# Patient Record
Sex: FEMALE | Race: WHITE | NOT HISPANIC OR LATINO
[De-identification: names, ages, dates, MRNs, and addresses within clinical notes are randomized per-mention and may not be internally consistent; named-entity substitution may affect disease eponyms.]

---

## 2018-06-28 PROBLEM — Z00.00 ENCOUNTER FOR PREVENTIVE HEALTH EXAMINATION: Status: ACTIVE | Noted: 2018-06-28

## 2018-08-08 ENCOUNTER — APPOINTMENT (OUTPATIENT)
Dept: ANTEPARTUM | Facility: CLINIC | Age: 23
End: 2018-08-08
Payer: COMMERCIAL

## 2018-08-08 ENCOUNTER — ASOB RESULT (OUTPATIENT)
Age: 23
End: 2018-08-08

## 2018-08-08 PROCEDURE — 76811 OB US DETAILED SNGL FETUS: CPT

## 2018-08-08 PROCEDURE — 76817 TRANSVAGINAL US OBSTETRIC: CPT

## 2018-11-27 ENCOUNTER — ASOB RESULT (OUTPATIENT)
Age: 23
End: 2018-11-27

## 2018-11-27 ENCOUNTER — APPOINTMENT (OUTPATIENT)
Dept: ANTEPARTUM | Facility: CLINIC | Age: 23
End: 2018-11-27
Payer: COMMERCIAL

## 2018-11-27 ENCOUNTER — APPOINTMENT (OUTPATIENT)
Dept: MATERNAL FETAL MEDICINE | Facility: CLINIC | Age: 23
End: 2018-11-27
Payer: COMMERCIAL

## 2018-11-27 VITALS
HEIGHT: 65 IN | OXYGEN SATURATION: 100 % | DIASTOLIC BLOOD PRESSURE: 76 MMHG | BODY MASS INDEX: 25.16 KG/M2 | WEIGHT: 151 LBS | SYSTOLIC BLOOD PRESSURE: 106 MMHG | HEART RATE: 96 BPM

## 2018-11-27 PROCEDURE — 99244 OFF/OP CNSLTJ NEW/EST MOD 40: CPT

## 2018-11-27 PROCEDURE — 76816 OB US FOLLOW-UP PER FETUS: CPT

## 2018-11-29 ENCOUNTER — APPOINTMENT (OUTPATIENT)
Dept: CARDIOLOGY | Facility: CLINIC | Age: 23
End: 2018-11-29
Payer: COMMERCIAL

## 2018-11-29 ENCOUNTER — NON-APPOINTMENT (OUTPATIENT)
Age: 23
End: 2018-11-29

## 2018-11-29 VITALS
HEART RATE: 84 BPM | SYSTOLIC BLOOD PRESSURE: 105 MMHG | BODY MASS INDEX: 25.16 KG/M2 | WEIGHT: 151 LBS | DIASTOLIC BLOOD PRESSURE: 70 MMHG | HEIGHT: 65 IN | OXYGEN SATURATION: 98 %

## 2018-11-29 DIAGNOSIS — O09.90 SUPERVISION OF HIGH RISK PREGNANCY, UNSPECIFIED, UNSPECIFIED TRIMESTER: ICD-10-CM

## 2018-11-29 DIAGNOSIS — I77.89 OTHER SPECIFIED DISORDERS OF ARTERIES AND ARTERIOLES: ICD-10-CM

## 2018-11-29 PROCEDURE — 99244 OFF/OP CNSLTJ NEW/EST MOD 40: CPT

## 2018-11-29 PROCEDURE — 93000 ELECTROCARDIOGRAM COMPLETE: CPT

## 2018-11-29 RX ORDER — METOPROLOL SUCCINATE 25 MG/1
25 TABLET, EXTENDED RELEASE ORAL
Qty: 45 | Refills: 3 | Status: ACTIVE | COMMUNITY
Start: 2018-11-29 | End: 1900-01-01

## 2018-11-29 RX ORDER — LEVOTHYROXINE SODIUM 100 UG/1
100 TABLET ORAL
Refills: 0 | Status: ACTIVE | COMMUNITY

## 2018-11-29 NOTE — REVIEW OF SYSTEMS
Pt reports she has been having diarrhea since Friday. Pt states lower abdominal pain and upper abdominal pain as well since Friday. Pt denies any nausea or vomiting. Pt denies eating anything out of the ordinary or going out to eat. Pt states now the pain is a little resolved, but she reports generalized abdominal pain. Pt was in the walk in clinic and sent to the ED for evaluation of rebound tenderness. Pt states the diarrhea has since resolved. Pt denies any fevers.    [Shortness Of Breath] : no shortness of breath [Dyspnea on exertion] : not dyspnea during exertion [Chest Pain] : no chest pain [Lower Ext Edema] : no extremity edema [Palpitations] : no palpitations [Negative] : Heme/Lymph

## 2018-11-29 NOTE — PHYSICAL EXAM
[General Appearance - Well Developed] : well developed [Normal Appearance] : normal appearance [Well Groomed] : well groomed [General Appearance - Well Nourished] : well nourished [No Deformities] : no deformities [General Appearance - In No Acute Distress] : no acute distress [Normal Conjunctiva] : the conjunctiva exhibited no abnormalities [Eyelids - No Xanthelasma] : the eyelids demonstrated no xanthelasmas [Normal Oral Mucosa] : normal oral mucosa [No Oral Pallor] : no oral pallor [No Oral Cyanosis] : no oral cyanosis [Normal Jugular Venous A Waves Present] : normal jugular venous A waves present [Normal Jugular Venous V Waves Present] : normal jugular venous V waves present [No Jugular Venous Holt A Waves] : no jugular venous holt A waves [Heart Rate And Rhythm] : heart rate and rhythm were normal [Heart Sounds] : normal S1 and S2 [Murmurs] : no murmurs present [Respiration, Rhythm And Depth] : normal respiratory rhythm and effort [Exaggerated Use Of Accessory Muscles For Inspiration] : no accessory muscle use [Auscultation Breath Sounds / Voice Sounds] : lungs were clear to auscultation bilaterally [Abdomen Soft] : soft [Abdomen Tenderness] : non-tender [Abdomen Mass (___ Cm)] : no abdominal mass palpated [Abnormal Walk] : normal gait [Gait - Sufficient For Exercise Testing] : the gait was sufficient for exercise testing [Nail Clubbing] : no clubbing of the fingernails [Cyanosis, Localized] : no localized cyanosis [Petechial Hemorrhages (___cm)] : no petechial hemorrhages [Skin Color & Pigmentation] : normal skin color and pigmentation [] : no rash [No Venous Stasis] : no venous stasis [Skin Lesions] : no skin lesions [No Skin Ulcers] : no skin ulcer [No Xanthoma] : no  xanthoma was observed [Oriented To Time, Place, And Person] : oriented to person, place, and time [Affect] : the affect was normal [Mood] : the mood was normal [No Anxiety] : not feeling anxious

## 2018-11-29 NOTE — DISCUSSION/SUMMARY
[FreeTextEntry1] : The patient is a 23-year-old female +FH aortic aneurysm, 36 weeks gestation who has been followed for aortic size. ECHO has been stable for years with normal aortic size. This was her first MRI with measurement of 3.8cm. I would recommend metoprolol 12.5mg daily and proceed with vaginal delivery facilitated second stage in view of family history. There are no cardiac indications for  section.

## 2018-11-29 NOTE — HISTORY OF PRESENT ILLNESS
[FreeTextEntry1] : Cindy is a 23-year-old female 36 weeks gestation with family history of dilated aorta who presents for evaluation. She has been followed at [facility] and will be delivering here.

## 2018-12-27 ENCOUNTER — ASOB RESULT (OUTPATIENT)
Age: 23
End: 2018-12-27

## 2018-12-27 ENCOUNTER — APPOINTMENT (OUTPATIENT)
Dept: ANTEPARTUM | Facility: CLINIC | Age: 23
End: 2018-12-27
Payer: COMMERCIAL

## 2018-12-27 PROCEDURE — 76816 OB US FOLLOW-UP PER FETUS: CPT | Mod: 26

## 2018-12-27 PROCEDURE — 76818 FETAL BIOPHYS PROFILE W/NST: CPT | Mod: 26

## 2018-12-29 ENCOUNTER — TRANSCRIPTION ENCOUNTER (OUTPATIENT)
Age: 23
End: 2018-12-29

## 2018-12-29 ENCOUNTER — INPATIENT (INPATIENT)
Facility: HOSPITAL | Age: 23
LOS: 3 days | Discharge: HOME | End: 2019-01-02
Attending: OBSTETRICS & GYNECOLOGY | Admitting: OBSTETRICS & GYNECOLOGY
Payer: COMMERCIAL

## 2018-12-29 DIAGNOSIS — Z3A.00 WEEKS OF GESTATION OF PREGNANCY NOT SPECIFIED: ICD-10-CM

## 2018-12-29 DIAGNOSIS — O26.899 OTHER SPECIFIED PREGNANCY RELATED CONDITIONS, UNSPECIFIED TRIMESTER: ICD-10-CM

## 2018-12-29 DIAGNOSIS — Z34.80 ENCOUNTER FOR SUPERVISION OF OTHER NORMAL PREGNANCY, UNSPECIFIED TRIMESTER: ICD-10-CM

## 2018-12-29 RX ORDER — SODIUM CHLORIDE 9 MG/ML
1000 INJECTION, SOLUTION INTRAVENOUS
Qty: 0 | Refills: 0 | Status: DISCONTINUED | OUTPATIENT
Start: 2018-12-29 | End: 2018-12-30

## 2018-12-29 RX ORDER — CITRIC ACID/SODIUM CITRATE 300-500 MG
15 SOLUTION, ORAL ORAL EVERY 4 HOURS
Qty: 0 | Refills: 0 | Status: DISCONTINUED | OUTPATIENT
Start: 2018-12-29 | End: 2018-12-30

## 2018-12-29 RX ORDER — OXYTOCIN 10 UNIT/ML
333.33 VIAL (ML) INJECTION
Qty: 20 | Refills: 0 | Status: DISCONTINUED | OUTPATIENT
Start: 2018-12-29 | End: 2018-12-30

## 2018-12-29 RX ORDER — SODIUM CHLORIDE 9 MG/ML
1000 INJECTION, SOLUTION INTRAVENOUS ONCE
Qty: 0 | Refills: 0 | Status: COMPLETED | OUTPATIENT
Start: 2018-12-29 | End: 2018-12-29

## 2018-12-29 RX ADMIN — SODIUM CHLORIDE 2000 MILLILITER(S): 9 INJECTION, SOLUTION INTRAVENOUS at 23:50

## 2018-12-30 VITALS — HEIGHT: 65 IN | WEIGHT: 147.71 LBS

## 2018-12-30 LAB
BASOPHILS # BLD AUTO: 0 K/UL — SIGNIFICANT CHANGE UP (ref 0–0.2)
BASOPHILS NFR BLD AUTO: 0.6 % — SIGNIFICANT CHANGE UP (ref 0–2)
BLD GP AB SCN SERPL QL: NEGATIVE — SIGNIFICANT CHANGE UP
EOSINOPHIL # BLD AUTO: 0.1 K/UL — SIGNIFICANT CHANGE UP (ref 0–0.5)
EOSINOPHIL NFR BLD AUTO: 0.9 % — SIGNIFICANT CHANGE UP (ref 0–6)
HCT VFR BLD CALC: 36.2 % — SIGNIFICANT CHANGE UP (ref 34.5–45)
HGB BLD-MCNC: 13.1 G/DL — SIGNIFICANT CHANGE UP (ref 11.5–15.5)
LYMPHOCYTES # BLD AUTO: 1.5 K/UL — SIGNIFICANT CHANGE UP (ref 1–3.3)
LYMPHOCYTES # BLD AUTO: 18.7 % — SIGNIFICANT CHANGE UP (ref 13–44)
MCHC RBC-ENTMCNC: 31.7 PG — SIGNIFICANT CHANGE UP (ref 27–34)
MCHC RBC-ENTMCNC: 36.3 GM/DL — HIGH (ref 32–36)
MCV RBC AUTO: 87.2 FL — SIGNIFICANT CHANGE UP (ref 80–100)
MONOCYTES # BLD AUTO: 0.6 K/UL — SIGNIFICANT CHANGE UP (ref 0–0.9)
MONOCYTES NFR BLD AUTO: 7.3 % — SIGNIFICANT CHANGE UP (ref 2–14)
NEUTROPHILS # BLD AUTO: 5.8 K/UL — SIGNIFICANT CHANGE UP (ref 1.8–7.4)
NEUTROPHILS NFR BLD AUTO: 72.5 % — SIGNIFICANT CHANGE UP (ref 43–77)
PLATELET # BLD AUTO: 138 K/UL — LOW (ref 150–400)
RBC # BLD: 4.15 M/UL — SIGNIFICANT CHANGE UP (ref 3.8–5.2)
RBC # FLD: 13.3 % — SIGNIFICANT CHANGE UP (ref 10.3–14.5)
RH IG SCN BLD-IMP: POSITIVE — SIGNIFICANT CHANGE UP
RH IG SCN BLD-IMP: POSITIVE — SIGNIFICANT CHANGE UP
T PALLIDUM AB TITR SER: NEGATIVE — SIGNIFICANT CHANGE UP
WBC # BLD: 7.9 K/UL — SIGNIFICANT CHANGE UP (ref 3.8–10.5)
WBC # FLD AUTO: 7.9 K/UL — SIGNIFICANT CHANGE UP (ref 3.8–10.5)

## 2018-12-30 RX ORDER — NALOXONE HYDROCHLORIDE 4 MG/.1ML
0.1 SPRAY NASAL
Qty: 0 | Refills: 0 | Status: DISCONTINUED | OUTPATIENT
Start: 2018-12-30 | End: 2019-01-01

## 2018-12-30 RX ORDER — SODIUM CHLORIDE 9 MG/ML
1000 INJECTION, SOLUTION INTRAVENOUS
Qty: 0 | Refills: 0 | Status: DISCONTINUED | OUTPATIENT
Start: 2018-12-30 | End: 2018-12-30

## 2018-12-30 RX ORDER — SODIUM CHLORIDE 9 MG/ML
1000 INJECTION, SOLUTION INTRAVENOUS
Qty: 0 | Refills: 0 | Status: DISCONTINUED | OUTPATIENT
Start: 2018-12-30 | End: 2019-01-02

## 2018-12-30 RX ORDER — CEFAZOLIN SODIUM 1 G
VIAL (EA) INJECTION
Qty: 0 | Refills: 0 | Status: DISCONTINUED | OUTPATIENT
Start: 2018-12-30 | End: 2018-12-30

## 2018-12-30 RX ORDER — OXYTOCIN 10 UNIT/ML
41.67 VIAL (ML) INJECTION
Qty: 20 | Refills: 0 | Status: DISCONTINUED | OUTPATIENT
Start: 2018-12-30 | End: 2019-01-02

## 2018-12-30 RX ORDER — SIMETHICONE 80 MG/1
80 TABLET, CHEWABLE ORAL EVERY 4 HOURS
Qty: 0 | Refills: 0 | Status: DISCONTINUED | OUTPATIENT
Start: 2018-12-30 | End: 2019-01-02

## 2018-12-30 RX ORDER — LEVOTHYROXINE SODIUM 125 MCG
100 TABLET ORAL DAILY
Qty: 0 | Refills: 0 | Status: DISCONTINUED | OUTPATIENT
Start: 2018-12-30 | End: 2018-12-31

## 2018-12-30 RX ORDER — DIPHENHYDRAMINE HCL 50 MG
25 CAPSULE ORAL EVERY 6 HOURS
Qty: 0 | Refills: 0 | Status: DISCONTINUED | OUTPATIENT
Start: 2018-12-30 | End: 2019-01-02

## 2018-12-30 RX ORDER — OXYTOCIN 10 UNIT/ML
333.33 VIAL (ML) INJECTION
Qty: 20 | Refills: 0 | Status: DISCONTINUED | OUTPATIENT
Start: 2018-12-30 | End: 2019-01-02

## 2018-12-30 RX ORDER — FERROUS SULFATE 325(65) MG
325 TABLET ORAL DAILY
Qty: 0 | Refills: 0 | Status: DISCONTINUED | OUTPATIENT
Start: 2018-12-30 | End: 2018-12-31

## 2018-12-30 RX ORDER — TETANUS TOXOID, REDUCED DIPHTHERIA TOXOID AND ACELLULAR PERTUSSIS VACCINE, ADSORBED 5; 2.5; 8; 8; 2.5 [IU]/.5ML; [IU]/.5ML; UG/.5ML; UG/.5ML; UG/.5ML
0.5 SUSPENSION INTRAMUSCULAR ONCE
Qty: 0 | Refills: 0 | Status: DISCONTINUED | OUTPATIENT
Start: 2018-12-30 | End: 2019-01-02

## 2018-12-30 RX ORDER — CEFAZOLIN SODIUM 1 G
2000 VIAL (EA) INJECTION EVERY 8 HOURS
Qty: 0 | Refills: 0 | Status: COMPLETED | OUTPATIENT
Start: 2018-12-30 | End: 2018-12-31

## 2018-12-30 RX ORDER — SODIUM CHLORIDE 9 MG/ML
1000 INJECTION INTRAMUSCULAR; INTRAVENOUS; SUBCUTANEOUS
Qty: 0 | Refills: 0 | Status: DISCONTINUED | OUTPATIENT
Start: 2018-12-30 | End: 2019-01-02

## 2018-12-30 RX ORDER — OXYTOCIN 10 UNIT/ML
4 VIAL (ML) INJECTION
Qty: 30 | Refills: 0 | Status: DISCONTINUED | OUTPATIENT
Start: 2018-12-30 | End: 2018-12-30

## 2018-12-30 RX ORDER — CEFAZOLIN SODIUM 1 G
2000 VIAL (EA) INJECTION ONCE
Qty: 0 | Refills: 0 | Status: DISCONTINUED | OUTPATIENT
Start: 2018-12-30 | End: 2018-12-30

## 2018-12-30 RX ORDER — KETOROLAC TROMETHAMINE 30 MG/ML
30 SYRINGE (ML) INJECTION EVERY 6 HOURS
Qty: 0 | Refills: 0 | Status: COMPLETED | OUTPATIENT
Start: 2018-12-30 | End: 2019-01-01

## 2018-12-30 RX ORDER — SODIUM CHLORIDE 9 MG/ML
300 INJECTION INTRAMUSCULAR; INTRAVENOUS; SUBCUTANEOUS ONCE
Qty: 0 | Refills: 0 | Status: DISCONTINUED | OUTPATIENT
Start: 2018-12-30 | End: 2019-01-02

## 2018-12-30 RX ORDER — LANOLIN
1 OINTMENT (GRAM) TOPICAL
Qty: 0 | Refills: 0 | Status: DISCONTINUED | OUTPATIENT
Start: 2018-12-30 | End: 2019-01-02

## 2018-12-30 RX ORDER — HEPARIN SODIUM 5000 [USP'U]/ML
5000 INJECTION INTRAVENOUS; SUBCUTANEOUS EVERY 12 HOURS
Qty: 0 | Refills: 0 | Status: DISCONTINUED | OUTPATIENT
Start: 2018-12-30 | End: 2019-01-02

## 2018-12-30 RX ORDER — DEXAMETHASONE 0.5 MG/5ML
4 ELIXIR ORAL EVERY 6 HOURS
Qty: 0 | Refills: 0 | Status: DISCONTINUED | OUTPATIENT
Start: 2018-12-30 | End: 2019-01-01

## 2018-12-30 RX ORDER — GLYCERIN ADULT
1 SUPPOSITORY, RECTAL RECTAL AT BEDTIME
Qty: 0 | Refills: 0 | Status: DISCONTINUED | OUTPATIENT
Start: 2018-12-30 | End: 2019-01-02

## 2018-12-30 RX ORDER — OXYTOCIN 10 UNIT/ML
1 VIAL (ML) INJECTION
Qty: 30 | Refills: 0 | Status: DISCONTINUED | OUTPATIENT
Start: 2018-12-30 | End: 2019-01-02

## 2018-12-30 RX ORDER — CEFAZOLIN SODIUM 1 G
2000 VIAL (EA) INJECTION EVERY 8 HOURS
Qty: 0 | Refills: 0 | Status: DISCONTINUED | OUTPATIENT
Start: 2018-12-30 | End: 2018-12-30

## 2018-12-30 RX ORDER — ONDANSETRON 8 MG/1
4 TABLET, FILM COATED ORAL EVERY 6 HOURS
Qty: 0 | Refills: 0 | Status: DISCONTINUED | OUTPATIENT
Start: 2018-12-30 | End: 2019-01-01

## 2018-12-30 RX ORDER — DOCUSATE SODIUM 100 MG
100 CAPSULE ORAL
Qty: 0 | Refills: 0 | Status: DISCONTINUED | OUTPATIENT
Start: 2018-12-30 | End: 2018-12-31

## 2018-12-30 RX ADMIN — Medication 30 MILLIGRAM(S): at 17:22

## 2018-12-30 RX ADMIN — Medication 4 MILLIUNIT(S)/MIN: at 09:38

## 2018-12-30 RX ADMIN — Medication 30 MILLIGRAM(S): at 23:40

## 2018-12-30 RX ADMIN — Medication 0.25 MILLIGRAM(S): at 11:27

## 2018-12-30 RX ADMIN — SODIUM CHLORIDE 125 MILLILITER(S): 9 INJECTION, SOLUTION INTRAVENOUS at 00:25

## 2018-12-30 RX ADMIN — Medication 100 MICROGRAM(S): at 11:23

## 2018-12-30 RX ADMIN — Medication 100 MILLIGRAM(S): at 23:41

## 2018-12-31 LAB
BASOPHILS # BLD AUTO: 0 K/UL — SIGNIFICANT CHANGE UP (ref 0–0.2)
BASOPHILS NFR BLD AUTO: 0.1 % — SIGNIFICANT CHANGE UP (ref 0–2)
EOSINOPHIL # BLD AUTO: 0.1 K/UL — SIGNIFICANT CHANGE UP (ref 0–0.5)
EOSINOPHIL NFR BLD AUTO: 1.6 % — SIGNIFICANT CHANGE UP (ref 0–6)
HCT VFR BLD CALC: 27.3 % — LOW (ref 34.5–45)
HGB BLD-MCNC: 9.5 G/DL — LOW (ref 11.5–15.5)
LYMPHOCYTES # BLD AUTO: 1.1 K/UL — SIGNIFICANT CHANGE UP (ref 1–3.3)
LYMPHOCYTES # BLD AUTO: 15.3 % — SIGNIFICANT CHANGE UP (ref 13–44)
MCHC RBC-ENTMCNC: 31.3 PG — SIGNIFICANT CHANGE UP (ref 27–34)
MCHC RBC-ENTMCNC: 34.9 GM/DL — SIGNIFICANT CHANGE UP (ref 32–36)
MCV RBC AUTO: 89.5 FL — SIGNIFICANT CHANGE UP (ref 80–100)
MONOCYTES # BLD AUTO: 0.5 K/UL — SIGNIFICANT CHANGE UP (ref 0–0.9)
MONOCYTES NFR BLD AUTO: 7.6 % — SIGNIFICANT CHANGE UP (ref 2–14)
NEUTROPHILS # BLD AUTO: 5.4 K/UL — SIGNIFICANT CHANGE UP (ref 1.8–7.4)
NEUTROPHILS NFR BLD AUTO: 75.3 % — SIGNIFICANT CHANGE UP (ref 43–77)
PLATELET # BLD AUTO: 105 K/UL — LOW (ref 150–400)
RBC # BLD: 3.05 M/UL — LOW (ref 3.8–5.2)
RBC # FLD: 13.6 % — SIGNIFICANT CHANGE UP (ref 10.3–14.5)
WBC # BLD: 7.1 K/UL — SIGNIFICANT CHANGE UP (ref 3.8–10.5)
WBC # FLD AUTO: 7.1 K/UL — SIGNIFICANT CHANGE UP (ref 3.8–10.5)

## 2018-12-31 RX ORDER — ASCORBIC ACID 60 MG
500 TABLET,CHEWABLE ORAL DAILY
Qty: 0 | Refills: 0 | Status: DISCONTINUED | OUTPATIENT
Start: 2018-12-31 | End: 2019-01-02

## 2018-12-31 RX ORDER — IBUPROFEN 200 MG
600 TABLET ORAL EVERY 6 HOURS
Qty: 0 | Refills: 0 | Status: COMPLETED | OUTPATIENT
Start: 2018-12-31 | End: 2019-11-29

## 2018-12-31 RX ORDER — FERROUS SULFATE 325(65) MG
325 TABLET ORAL
Qty: 0 | Refills: 0 | Status: DISCONTINUED | OUTPATIENT
Start: 2018-12-31 | End: 2019-01-02

## 2018-12-31 RX ORDER — OXYCODONE HYDROCHLORIDE 5 MG/1
5 TABLET ORAL EVERY 4 HOURS
Qty: 0 | Refills: 0 | Status: COMPLETED | OUTPATIENT
Start: 2018-12-31 | End: 2019-01-07

## 2018-12-31 RX ORDER — OXYCODONE HYDROCHLORIDE 5 MG/1
5 TABLET ORAL
Qty: 0 | Refills: 0 | Status: COMPLETED | OUTPATIENT
Start: 2018-12-31 | End: 2019-01-07

## 2018-12-31 RX ORDER — ACETAMINOPHEN 500 MG
975 TABLET ORAL EVERY 6 HOURS
Qty: 0 | Refills: 0 | Status: DISCONTINUED | OUTPATIENT
Start: 2018-12-31 | End: 2019-01-02

## 2018-12-31 RX ORDER — OXYCODONE HYDROCHLORIDE 5 MG/1
5 TABLET ORAL
Qty: 0 | Refills: 0 | Status: DISCONTINUED | OUTPATIENT
Start: 2018-12-31 | End: 2019-01-02

## 2018-12-31 RX ORDER — LEVOTHYROXINE SODIUM 125 MCG
75 TABLET ORAL DAILY
Qty: 0 | Refills: 0 | Status: DISCONTINUED | OUTPATIENT
Start: 2018-12-31 | End: 2019-01-02

## 2018-12-31 RX ORDER — DOCUSATE SODIUM 100 MG
100 CAPSULE ORAL
Qty: 0 | Refills: 0 | Status: DISCONTINUED | OUTPATIENT
Start: 2018-12-31 | End: 2019-01-02

## 2018-12-31 RX ADMIN — Medication 30 MILLIGRAM(S): at 05:04

## 2018-12-31 RX ADMIN — Medication 30 MILLIGRAM(S): at 00:30

## 2018-12-31 RX ADMIN — Medication 30 MILLIGRAM(S): at 17:24

## 2018-12-31 RX ADMIN — HEPARIN SODIUM 5000 UNIT(S): 5000 INJECTION INTRAVENOUS; SUBCUTANEOUS at 16:54

## 2018-12-31 RX ADMIN — Medication 75 MICROGRAM(S): at 05:04

## 2018-12-31 RX ADMIN — Medication 30 MILLIGRAM(S): at 11:12

## 2018-12-31 RX ADMIN — Medication 30 MILLIGRAM(S): at 23:05

## 2018-12-31 RX ADMIN — Medication 975 MILLIGRAM(S): at 21:29

## 2018-12-31 RX ADMIN — Medication 30 MILLIGRAM(S): at 23:35

## 2018-12-31 RX ADMIN — Medication 975 MILLIGRAM(S): at 14:31

## 2018-12-31 RX ADMIN — Medication 100 MILLIGRAM(S): at 20:59

## 2018-12-31 RX ADMIN — OXYCODONE HYDROCHLORIDE 5 MILLIGRAM(S): 5 TABLET ORAL at 21:30

## 2018-12-31 RX ADMIN — Medication 100 MILLIGRAM(S): at 07:40

## 2018-12-31 RX ADMIN — Medication 325 MILLIGRAM(S): at 09:04

## 2018-12-31 RX ADMIN — HEPARIN SODIUM 5000 UNIT(S): 5000 INJECTION INTRAVENOUS; SUBCUTANEOUS at 05:04

## 2018-12-31 RX ADMIN — SODIUM CHLORIDE 125 MILLILITER(S): 9 INJECTION, SOLUTION INTRAVENOUS at 14:29

## 2018-12-31 RX ADMIN — Medication 325 MILLIGRAM(S): at 20:59

## 2018-12-31 RX ADMIN — Medication 975 MILLIGRAM(S): at 15:00

## 2018-12-31 RX ADMIN — OXYCODONE HYDROCHLORIDE 5 MILLIGRAM(S): 5 TABLET ORAL at 21:00

## 2018-12-31 RX ADMIN — Medication 1 TABLET(S): at 11:14

## 2018-12-31 RX ADMIN — Medication 500 MILLIGRAM(S): at 11:13

## 2018-12-31 RX ADMIN — Medication 975 MILLIGRAM(S): at 20:59

## 2018-12-31 RX ADMIN — Medication 30 MILLIGRAM(S): at 11:45

## 2018-12-31 RX ADMIN — Medication 100 MILLIGRAM(S): at 09:04

## 2018-12-31 RX ADMIN — Medication 30 MILLIGRAM(S): at 16:54

## 2018-12-31 NOTE — PROGRESS NOTE ADULT - SUBJECTIVE AND OBJECTIVE BOX
Day 1 of Anesthesia Pain Management Service    SUBJECTIVE: I'm okay  Pain Scale Score:    [X] Refer to charted pain scores    THERAPY: Epidural Bupivacaine 0.01 % and Fentanyl 3 micrograms/mL     Demand Dose: 3 mL  Lockout: 15 minutes   Continuous Rate:  10 mL    MEDICATIONS  (STANDING):  ascorbic acid 500 milliGRAM(s) Oral daily  ceFAZolin   IVPB 2000 milliGRAM(s) IV Intermittent every 8 hours  diphtheria/tetanus/pertussis (acellular) Vaccine (ADAcel) 0.5 milliLiter(s) IntraMuscular once  docusate sodium 100 milliGRAM(s) Oral two times a day  fentaNYL (3 MICROgram(s)/mL) + BUpivacaine 0.01% in 0.9% Sodium Chloride PCEA 250 milliLiter(s) Epidural PCA Continuous  ferrous    sulfate 325 milliGRAM(s) Oral two times a day  heparin  Injectable 5000 Unit(s) SubCutaneous every 12 hours  ketorolac   Injectable 30 milliGRAM(s) IV Push every 6 hours  lactated ringers. 1000 milliLiter(s) (125 mL/Hr) IV Continuous <Continuous>  levothyroxine 75 MICROGram(s) Oral daily  oxytocin Infusion 1 milliUNIT(s)/Min (1 mL/Hr) IV Continuous <Continuous>  oxytocin Infusion 333.333 milliUNIT(s)/Min (1000 mL/Hr) IV Continuous <Continuous>  oxytocin Infusion 41.667 milliUNIT(s)/Min (125 mL/Hr) IV Continuous <Continuous>  oxytocin Infusion 41.667 milliUNIT(s)/Min (125 mL/Hr) IV Continuous <Continuous>  prenatal multivitamin 1 Tablet(s) Oral daily  sodium chloride 0.9% Bolus 300 milliLiter(s) IV Bolus once  sodium chloride 0.9%. 1000 milliLiter(s) (125 mL/Hr) IV Continuous <Continuous>    MEDICATIONS  (PRN):  dexamethasone  Injectable 4 milliGRAM(s) IV Push every 6 hours PRN Nausea, IF ondansetron is ineffective after 30 - 60 minutes  diphenhydrAMINE 25 milliGRAM(s) Oral every 6 hours PRN Itching  fentaNYL (3 MICROgram(s)/mL) + BUpivacaine 0.01% in 0.9% Sodium Chloride PCEA Rescue Clinician Bolus 5 milliLiter(s) Epidural every 15 minutes PRN Moderate Pain (4 - 6)  glycerin Suppository - Adult 1 Suppository(s) Rectal at bedtime PRN Constipation  lanolin Ointment 1 Application(s) Topical every 3 hours PRN Sore Nipples  naloxone Injectable 0.1 milliGRAM(s) IV Push every 3 minutes PRN For ANY of the following changes in patient status:  A. RR LESS THAN 10 breaths per minute, B. Oxygen saturation LESS THAN 90%, C. Sedation score of 6  ondansetron Injectable 4 milliGRAM(s) IV Push every 6 hours PRN Nausea  simethicone 80 milliGRAM(s) Chew every 4 hours PRN Gas      OBJECTIVE:    Assessment of Epidural Catheter Site: 	    [X] Dressing intact	[X] Site non-tender	[X] Site without erythema, discharge, edema  [X] Epidural tubing and connection checked	[X] Gross neurological exam within normal limits  [ ] Catheter removed – tip intact		                          9.5    7.1   )-----------( 105      ( 31 Dec 2018 06:36 )             27.3     Vital Signs Last 24 Hrs  T(C): 36.7 (12-31-18 @ 05:39), Max: 36.9 (12-30-18 @ 22:00)  T(F): 98.1 (12-31-18 @ 05:39), Max: 98.4 (12-30-18 @ 22:00)  HR: 70 (12-31-18 @ 01:00) (70 - 132)  BP: 90/60 (12-31-18 @ 01:00) (89/52 - 111/53)  BP(mean): 69 (12-30-18 @ 18:55) (69 - 85)  RR: 18 (12-31-18 @ 01:00) (18 - 28)  SpO2: 100% (12-31-18 @ 01:00) (95% - 100%)      Sedation Score:	[X] Alert	[ ] Drowsy	[ ] Arousable  [ ] Asleep  [ ] Unresponsive    Side Effects:	[X] None	[ ] Nausea	[ ] Vomiting  [ ] Pruritus  		[ ] Weakness  [ ] Numbness  [ ] Other:    ASSESSMENT/ PLAN:    Therapy:                         [X] Continue   [ ] Discontinue   [ ] Change to PRN Analgesics    Documentation and Verification of current medications:  [X] Done	[ ] Not done, not eligible, reason:    Comments:

## 2018-12-31 NOTE — PROGRESS NOTE ADULT - SUBJECTIVE AND OBJECTIVE BOX
PA POD # 1 C/S Note    Blood Type:  A+             Rubella:  Immune                 RPR:  NR    Pain:  Controlled  Complaints:  None  Pt. is ambulating, voiding, unsure of passing flatus, & tolerating regular diet.  Lochia:  WNL    VS:  T(C): 36.7 (12-31-18 @ 05:39), Max: 36.9 (12-30-18 @ 22:00)  HR: 70 (12-31-18 @ 01:00) (70 - 132)  BP: 90/60 (12-31-18 @ 01:00) (89/52 - 111/53)  RR: 18 (12-31-18 @ 01:00) (18 - 28)  SpO2: 100% (12-31-18 @ 01:00) (95% - 100%)                        9.5    7.1   )-----------( 105      ( 31 Dec 2018 06:36 )             27.3     Complete Blood Count + Automated Diff (12.30.18 @ 01:11)    WBC Count: 7.9 K/uL    Hemoglobin: 13.1 g/dL    Hematocrit: 36.2 %    Platelet Count - Automated: 138 K/uL     Abd:  Soft, non-distended, non-tender with asymptomatic diastasis            Fundus-firm            Incision- C/D/I-SQ with Dermabond  Extremities:  Edema - none                     Calf Tenderness - none    Assessment:    23 y.o. G 1  P 1001      POD # 1 S/P Primary C/S for Failure to Progress & CAT II  in stable condition.    PMHx significant for:  Aortic Root Dilation, Ing. Herniorraphy, Hypothyroidism  Current Issues:  Anemia-asx  Plan:  Increase OOB             Cont. Tylenol, Toradol, PCEA, IVF             CBC as above             Cont. Reg. Diet             Cont. PO Care.            Cont. DVT PPx            Synthroid            Pt. advised to wear abd. binder when OOB for comfort             Iron Supplementation    RAUL Parish

## 2018-12-31 NOTE — PROGRESS NOTE ADULT - ASSESSMENT
23 y.o. G 1  P 1001      POD # 1 S/P Primary C/S for Failure to Progress & CAT II  in stable condition

## 2018-12-31 NOTE — PROGRESS NOTE ADULT - SUBJECTIVE AND OBJECTIVE BOX
VS:Vital Signs Last 24 Hrs  T(C): 36.7 (31 Dec 2018 05:39), Max: 36.9 (30 Dec 2018 22:00)  T(F): 98.1 (31 Dec 2018 05:39), Max: 98.4 (30 Dec 2018 22:00)  HR: 70 (31 Dec 2018 01:00) (70 - 132)  BP: 90/60 (31 Dec 2018 01:00) (89/52 - 111/53)  BP(mean): 69 (30 Dec 2018 18:55) (69 - 85)  RR: 18 (31 Dec 2018 01:00) (18 - 28)  SpO2: 100% (31 Dec 2018 01:00) (95% - 100%)    Abdomen soft, fundus firm  Incision clean, dry and intact  Lochia WNL  Voiding, stable

## 2019-01-01 RX ORDER — IBUPROFEN 200 MG
600 TABLET ORAL EVERY 6 HOURS
Qty: 0 | Refills: 0 | Status: DISCONTINUED | OUTPATIENT
Start: 2019-01-01 | End: 2019-01-02

## 2019-01-01 RX ORDER — OXYCODONE HYDROCHLORIDE 5 MG/1
5 TABLET ORAL EVERY 4 HOURS
Qty: 0 | Refills: 0 | Status: DISCONTINUED | OUTPATIENT
Start: 2019-01-01 | End: 2019-01-02

## 2019-01-01 RX ADMIN — Medication 600 MILLIGRAM(S): at 13:19

## 2019-01-01 RX ADMIN — Medication 975 MILLIGRAM(S): at 15:22

## 2019-01-01 RX ADMIN — Medication 500 MILLIGRAM(S): at 12:38

## 2019-01-01 RX ADMIN — Medication 1 TABLET(S): at 12:38

## 2019-01-01 RX ADMIN — Medication 975 MILLIGRAM(S): at 09:10

## 2019-01-01 RX ADMIN — Medication 975 MILLIGRAM(S): at 03:41

## 2019-01-01 RX ADMIN — Medication 600 MILLIGRAM(S): at 18:36

## 2019-01-01 RX ADMIN — HEPARIN SODIUM 5000 UNIT(S): 5000 INJECTION INTRAVENOUS; SUBCUTANEOUS at 18:36

## 2019-01-01 RX ADMIN — Medication 975 MILLIGRAM(S): at 03:11

## 2019-01-01 RX ADMIN — Medication 100 MILLIGRAM(S): at 20:04

## 2019-01-01 RX ADMIN — Medication 975 MILLIGRAM(S): at 16:00

## 2019-01-01 RX ADMIN — Medication 100 MILLIGRAM(S): at 08:47

## 2019-01-01 RX ADMIN — Medication 600 MILLIGRAM(S): at 19:10

## 2019-01-01 RX ADMIN — Medication 975 MILLIGRAM(S): at 21:00

## 2019-01-01 RX ADMIN — Medication 600 MILLIGRAM(S): at 12:38

## 2019-01-01 RX ADMIN — Medication 975 MILLIGRAM(S): at 08:46

## 2019-01-01 RX ADMIN — Medication 325 MILLIGRAM(S): at 20:03

## 2019-01-01 RX ADMIN — Medication 325 MILLIGRAM(S): at 08:47

## 2019-01-01 RX ADMIN — Medication 975 MILLIGRAM(S): at 21:30

## 2019-01-01 NOTE — PROGRESS NOTE ADULT - SUBJECTIVE AND OBJECTIVE BOX
Vital Signs Last 24 Hrs  T(C): 36.9 (31 Dec 2018 21:00), Max: 36.9 (31 Dec 2018 17:30)  T(F): 98.4 (31 Dec 2018 21:00), Max: 98.4 (31 Dec 2018 17:30)  HR: 87 (31 Dec 2018 21:00) (86 - 89)  BP: 108/71 (31 Dec 2018 21:00) (92/52 - 108/71)  BP(mean): --  RR: 18 (31 Dec 2018 21:00) (18 - 18)  SpO2: 99% (31 Dec 2018 17:30) (98% - 99%)    Abdomen soft  Fundus Firm  Incision clean, dry and intact  Lochia WNL  voiding  stable

## 2019-01-02 ENCOUNTER — TRANSCRIPTION ENCOUNTER (OUTPATIENT)
Age: 24
End: 2019-01-02

## 2019-01-02 VITALS
RESPIRATION RATE: 18 BRPM | DIASTOLIC BLOOD PRESSURE: 66 MMHG | SYSTOLIC BLOOD PRESSURE: 102 MMHG | OXYGEN SATURATION: 96 % | HEART RATE: 82 BPM | TEMPERATURE: 98 F

## 2019-01-02 LAB
BASOPHILS # BLD AUTO: 0 K/UL — SIGNIFICANT CHANGE UP (ref 0–0.2)
BASOPHILS NFR BLD AUTO: 0.3 % — SIGNIFICANT CHANGE UP (ref 0–2)
EOSINOPHIL # BLD AUTO: 0.2 K/UL — SIGNIFICANT CHANGE UP (ref 0–0.5)
EOSINOPHIL NFR BLD AUTO: 2.9 % — SIGNIFICANT CHANGE UP (ref 0–6)
HCT VFR BLD CALC: 27 % — LOW (ref 34.5–45)
HGB BLD-MCNC: 9.7 G/DL — LOW (ref 11.5–15.5)
LYMPHOCYTES # BLD AUTO: 1 K/UL — SIGNIFICANT CHANGE UP (ref 1–3.3)
LYMPHOCYTES # BLD AUTO: 18.8 % — SIGNIFICANT CHANGE UP (ref 13–44)
MCHC RBC-ENTMCNC: 31.9 PG — SIGNIFICANT CHANGE UP (ref 27–34)
MCHC RBC-ENTMCNC: 35.7 GM/DL — SIGNIFICANT CHANGE UP (ref 32–36)
MCV RBC AUTO: 89.2 FL — SIGNIFICANT CHANGE UP (ref 80–100)
MONOCYTES # BLD AUTO: 0.3 K/UL — SIGNIFICANT CHANGE UP (ref 0–0.9)
MONOCYTES NFR BLD AUTO: 6.4 % — SIGNIFICANT CHANGE UP (ref 2–14)
NEUTROPHILS # BLD AUTO: 3.8 K/UL — SIGNIFICANT CHANGE UP (ref 1.8–7.4)
NEUTROPHILS NFR BLD AUTO: 71.5 % — SIGNIFICANT CHANGE UP (ref 43–77)
PLATELET # BLD AUTO: 141 K/UL — LOW (ref 150–400)
RBC # BLD: 3.03 M/UL — LOW (ref 3.8–5.2)
RBC # FLD: 13.3 % — SIGNIFICANT CHANGE UP (ref 10.3–14.5)
WBC # BLD: 5.3 K/UL — SIGNIFICANT CHANGE UP (ref 3.8–10.5)
WBC # FLD AUTO: 5.3 K/UL — SIGNIFICANT CHANGE UP (ref 3.8–10.5)

## 2019-01-02 PROCEDURE — 86901 BLOOD TYPING SEROLOGIC RH(D): CPT

## 2019-01-02 PROCEDURE — 59050 FETAL MONITOR W/REPORT: CPT

## 2019-01-02 PROCEDURE — G0463: CPT

## 2019-01-02 PROCEDURE — 59025 FETAL NON-STRESS TEST: CPT

## 2019-01-02 PROCEDURE — 86850 RBC ANTIBODY SCREEN: CPT

## 2019-01-02 PROCEDURE — 86900 BLOOD TYPING SEROLOGIC ABO: CPT

## 2019-01-02 PROCEDURE — 86780 TREPONEMA PALLIDUM: CPT

## 2019-01-02 PROCEDURE — 85027 COMPLETE CBC AUTOMATED: CPT

## 2019-01-02 RX ADMIN — Medication 75 MICROGRAM(S): at 06:29

## 2019-01-02 RX ADMIN — Medication 600 MILLIGRAM(S): at 06:29

## 2019-01-02 RX ADMIN — Medication 1 TABLET(S): at 10:53

## 2019-01-02 RX ADMIN — Medication 325 MILLIGRAM(S): at 08:21

## 2019-01-02 RX ADMIN — Medication 600 MILLIGRAM(S): at 00:03

## 2019-01-02 RX ADMIN — Medication 975 MILLIGRAM(S): at 09:30

## 2019-01-02 RX ADMIN — Medication 600 MILLIGRAM(S): at 06:56

## 2019-01-02 RX ADMIN — Medication 500 MILLIGRAM(S): at 08:23

## 2019-01-02 RX ADMIN — Medication 600 MILLIGRAM(S): at 00:33

## 2019-01-02 RX ADMIN — Medication 100 MILLIGRAM(S): at 08:21

## 2019-01-02 RX ADMIN — Medication 975 MILLIGRAM(S): at 08:21

## 2019-01-02 RX ADMIN — HEPARIN SODIUM 5000 UNIT(S): 5000 INJECTION INTRAVENOUS; SUBCUTANEOUS at 06:30

## 2019-01-02 NOTE — DISCHARGE NOTE OB - PRO FEEDING PLAN INFANT OB
Health Maintenance Summary     Topic Due On Due Status Completed On Postpone Until Reason    MAMMOGRAM - BREAST CANCER SCREENING Aug 17, 2015 Postponed  Dec 11, 2018 Patient Refused    Colorectal Cancer Screening - Colonoscopy Aug 17, 2010 Postponed  Dec 11, 2018 Patient Refused    Pap Smear - Cervical Cancer Screening  Oct 25, 2022 Not Due Oct 25, 2017      Immunization - TDAP Pregnancy  Hidden       IMMUNIZATION - DTaP/Tdap/Td May 4, 2019 Not Due May 4, 2009      Immunization-Influenza Sep 1, 2018 Not Due       Depression Screening Oct 25, 2018 Not Due Oct 25, 2017      Hepatitis C Screening Aug 17, 2011 Postponed  Jul 3, 2018 Patient Refused          Patient is up to date, no discussion needed .             breast and formula feeding

## 2019-01-02 NOTE — PROGRESS NOTE ADULT - ASSESSMENT
A/P:  23y G P POD #3 S/P   section for failure of the vertex to descend, cervix to dilate, and persistent variable decels   Doing well    PMHx:PAST MEDICAL & SURGICAL HISTORY:    Current Issues:    Increase OOB  Renew IVF  Renew PCEA  DVT ppx  Dressing removed  Regular diet  AM CBC  Routine Postpartum/Post-op care  d/c today

## 2019-01-02 NOTE — DISCHARGE NOTE OB - MATERIALS PROVIDED
Birth Certificate Instructions/[facility]  Screening Program/Breastfeeding Guide and Packet/Back To Sleep Handout/Shaken Baby Prevention Handout/Vaccinations/[facility] Hearing Screen Program/Breastfeeding Mother’s Support Group Information/Guide to Postpartum Care

## 2019-01-02 NOTE — DISCHARGE NOTE OB - CARE PROVIDER_API CALL
Lee Ann Lozano), Obstetrics and Gynecology  [address] [facility] [address]  [address]  [place] [address]  Phone: [phone]  Fax: [phone]

## 2019-01-02 NOTE — DISCHARGE NOTE OB - CARE PLAN
Principal Discharge DX:	 delivery delivered  Goal:	return to adls  Assessment and plan of treatment:	rto as directed

## 2019-01-02 NOTE — DISCHARGE NOTE OB - PATIENT PORTAL LINK FT
You can access the [facility] Patient Portal, offered by [facility], by registering with the following website: [email or web address]

## 2019-01-02 NOTE — PROGRESS NOTE ADULT - SUBJECTIVE AND OBJECTIVE BOX
Postpartum Note-  Section POD#3- note written on behalf of Dr. Rojo exam     Prenatal Labs  Blood type: A Positive  Rubella IgG:   RPR: Negative    S:Patient w/o complaints, pain is controlled.  Pt is OOB, tolerating PO, passing flatus. Voiding. Lochia WNL.     O:  Vital Signs Last 24 Hrs  T(C): 36.5 (2019 05:00), Max: 36.9 (2019 21:00)  T(F): 97.7 (2019 05:00), Max: 98.4 (2019 21:00)  HR: 82 (2019 05:00) (80 - 84)  BP: 102/66 (2019 05:00) (101/69 - 118/75)  BP(mean): --  RR: 18 (2019 05:00) (18 - 18)  SpO2: 96% (2019 05:00) (96% - 96%)  I&O's Summary      Gen: NAD  Abdomen: Soft, appropriate tenderness, non-distended, fundus firm.  Incision: Clean/dry/ intact. no erythema, no ecchymosis   Sub Q    Lochia WNL  Ext:Nontender    LABS:             9.7    5.3   )-----------( 141      (  @ 06:27 )             27.0                9.5    7.1   )-----------( 105      (  @ 06:36 )             27.3

## 2019-01-07 ENCOUNTER — TRANSCRIPTION ENCOUNTER (OUTPATIENT)
Age: 24
End: 2019-01-07

## 2019-07-25 ENCOUNTER — RESULT REVIEW (OUTPATIENT)
Age: 24
End: 2019-07-25

## 2020-08-13 ENCOUNTER — RESULT REVIEW (OUTPATIENT)
Age: 25
End: 2020-08-13

## 2020-12-31 ENCOUNTER — RESULT REVIEW (OUTPATIENT)
Age: 25
End: 2020-12-31

## 2021-01-01 NOTE — DISCHARGE NOTE OB - HOSPITAL COURSE
section for failure of the vertex to descend, cervix to dilate, and persistent variable decels
Carmen Vieira  (RN)  2021 06:30:14

## 2021-07-01 ENCOUNTER — TRANSCRIPTION ENCOUNTER (OUTPATIENT)
Age: 26
End: 2021-07-01

## 2021-08-02 ENCOUNTER — ASOB RESULT (OUTPATIENT)
Age: 26
End: 2021-08-02

## 2021-08-02 ENCOUNTER — APPOINTMENT (OUTPATIENT)
Dept: ANTEPARTUM | Facility: CLINIC | Age: 26
End: 2021-08-02

## 2021-08-02 ENCOUNTER — APPOINTMENT (OUTPATIENT)
Dept: ANTEPARTUM | Facility: CLINIC | Age: 26
End: 2021-08-02
Payer: COMMERCIAL

## 2021-08-02 PROCEDURE — 76818 FETAL BIOPHYS PROFILE W/NST: CPT

## 2021-08-02 PROCEDURE — 76816 OB US FOLLOW-UP PER FETUS: CPT

## 2021-08-03 ENCOUNTER — OUTPATIENT (OUTPATIENT)
Dept: INPATIENT UNIT | Facility: HOSPITAL | Age: 26
LOS: 1 days | End: 2021-08-03
Payer: COMMERCIAL

## 2021-08-03 DIAGNOSIS — Z3A.00 WEEKS OF GESTATION OF PREGNANCY NOT SPECIFIED: ICD-10-CM

## 2021-08-03 DIAGNOSIS — O26.899 OTHER SPECIFIED PREGNANCY RELATED CONDITIONS, UNSPECIFIED TRIMESTER: ICD-10-CM

## 2021-08-03 PROCEDURE — G0463: CPT

## 2021-08-03 PROCEDURE — 59025 FETAL NON-STRESS TEST: CPT

## 2021-08-03 PROCEDURE — 59025 FETAL NON-STRESS TEST: CPT | Mod: 26

## 2021-08-04 VITALS
HEART RATE: 78 BPM | RESPIRATION RATE: 16 BRPM | TEMPERATURE: 98 F | DIASTOLIC BLOOD PRESSURE: 59 MMHG | SYSTOLIC BLOOD PRESSURE: 108 MMHG

## 2021-08-04 VITALS — SYSTOLIC BLOOD PRESSURE: 114 MMHG | DIASTOLIC BLOOD PRESSURE: 74 MMHG | HEART RATE: 87 BPM

## 2021-08-04 NOTE — OB PROVIDER TRIAGE NOTE - NS_OBGYNHISTORY_OBGYN_ALL_OB_FT
ObHx: FT c/s 2018 8lbs 1oz non reassuring fetal tracing at 6cm    GynHx: denies fibroids, cysts, abnormal paps, STIs

## 2021-08-04 NOTE — OB PROVIDER TRIAGE NOTE - HISTORY OF PRESENT ILLNESS
26 year old  at 40.3 weeks presents with contractions 4-5min, -LOF, -VB, +FM. Uncomplicated pregnancy. -GBS.    ObHx: FT c/s  8lbs 1oz non reassuring fetal tracing at 6cm  MedHx: hypothyroidism  SurgHx: c/s ; inguinal hernia repair   GynHx: denies fibroids, cysts, abnormal paps, STIs  SocialHx: denies ETOH, tobacco, drug use  PyschHx: denies anxiety, depression, PPD  All: PCN-rash; Sulfa-rash  Meds: PNV, Synthyroid 125mg PO daily    Vital Signs Last 24 Hrs  T(C): 36.8 (04 Aug 2021 00:18), Max: 36.8 (04 Aug 2021 00:14)  T(F): 98.2 (04 Aug 2021 00:18), Max: 98.24 (04 Aug 2021 00:14)  HR: 84 (04 Aug 2021 00:33) (84 - 107)  BP: 114/74 (04 Aug 2021 00:18) (114/74 - 114/74)  BP(mean): --  RR: 16 (04 Aug 2021 00:18) (16 - 16)  SpO2: 96% (04 Aug 2021 00:33) (96% - 96%)    PE: NAD, AOx3, abdomen soft gravid  VE: 0.5/0/-3 posterior (VE by MD Lozano)  EFM: 130, moderate variability, +accels, -decels  P[place]: 2-5 min  EFW: 4100 grams

## 2021-08-04 NOTE — OB PROVIDER TRIAGE NOTE - NSOBPROVIDERNOTE_OBGYN_ALL_OB_FT
26 year old  at 40.3 weeks not in labor, unchanged exam from office, as per MD Lozano patient has been experiencing contractions for several days. Patient not requesting pain medication at this time and desires TOLAC with spontaneous labor. BPP in office yesterday  no repeat sono needed as per MD Lozano    -Discharge home with labor precautions  -Return to OB office Thursday  -C/S  if undelivered    d/w MD Dionne Benitez FNP-BC

## 2021-08-04 NOTE — OB PROVIDER TRIAGE NOTE - ADDITIONAL INSTRUCTIONS
26 year old  at 40.3 weeks not in labor, unchanged exam from office, as per MD Lozano patient has been experiencing contractions for several days. Patient not requesting pain medication at this time and desires TOLAC with spontaneous labor. BPP in office yesterday  no repeat sono needed as per MD Lozano    -Discharge home with labor precautions  -Return to OB office Thursday  -C/S  if undelivered

## 2021-08-05 ENCOUNTER — APPOINTMENT (OUTPATIENT)
Dept: ANTEPARTUM | Facility: CLINIC | Age: 26
End: 2021-08-05
Payer: COMMERCIAL

## 2021-08-05 ENCOUNTER — ASOB RESULT (OUTPATIENT)
Age: 26
End: 2021-08-05

## 2021-08-05 PROCEDURE — 76818 FETAL BIOPHYS PROFILE W/NST: CPT

## 2021-08-06 ENCOUNTER — TRANSCRIPTION ENCOUNTER (OUTPATIENT)
Age: 26
End: 2021-08-06

## 2021-08-06 ENCOUNTER — OUTPATIENT (OUTPATIENT)
Dept: OUTPATIENT SERVICES | Facility: HOSPITAL | Age: 26
LOS: 1 days | End: 2021-08-06
Payer: COMMERCIAL

## 2021-08-06 DIAGNOSIS — Z11.52 ENCOUNTER FOR SCREENING FOR COVID-19: ICD-10-CM

## 2021-08-06 LAB — SARS-COV-2 RNA SPEC QL NAA+PROBE: SIGNIFICANT CHANGE UP

## 2021-08-06 PROCEDURE — U0005: CPT

## 2021-08-06 PROCEDURE — C9803: CPT

## 2021-08-06 PROCEDURE — U0003: CPT

## 2021-08-07 ENCOUNTER — INPATIENT (INPATIENT)
Facility: HOSPITAL | Age: 26
LOS: 1 days | Discharge: HOME | End: 2021-08-09
Attending: OBSTETRICS & GYNECOLOGY | Admitting: OBSTETRICS & GYNECOLOGY
Payer: COMMERCIAL

## 2021-08-07 VITALS — SYSTOLIC BLOOD PRESSURE: 113 MMHG | DIASTOLIC BLOOD PRESSURE: 64 MMHG

## 2021-08-07 DIAGNOSIS — O26.899 OTHER SPECIFIED PREGNANCY RELATED CONDITIONS, UNSPECIFIED TRIMESTER: ICD-10-CM

## 2021-08-07 DIAGNOSIS — Z34.80 ENCOUNTER FOR SUPERVISION OF OTHER NORMAL PREGNANCY, UNSPECIFIED TRIMESTER: ICD-10-CM

## 2021-08-07 DIAGNOSIS — Z3A.00 WEEKS OF GESTATION OF PREGNANCY NOT SPECIFIED: ICD-10-CM

## 2021-08-07 PROBLEM — E03.9 HYPOTHYROIDISM, UNSPECIFIED: Chronic | Status: ACTIVE | Noted: 2021-08-04

## 2021-08-07 LAB
APTT BLD: 27.7 SEC — SIGNIFICANT CHANGE UP (ref 27.5–35.5)
BASOPHILS # BLD AUTO: 0.03 K/UL — SIGNIFICANT CHANGE UP (ref 0–0.2)
BASOPHILS NFR BLD AUTO: 0.4 % — SIGNIFICANT CHANGE UP (ref 0–2)
COVID-19 SPIKE DOMAIN AB INTERP: POSITIVE
COVID-19 SPIKE DOMAIN ANTIBODY RESULT: >250 U/ML — HIGH
EOSINOPHIL # BLD AUTO: 0.06 K/UL — SIGNIFICANT CHANGE UP (ref 0–0.5)
EOSINOPHIL NFR BLD AUTO: 0.8 % — SIGNIFICANT CHANGE UP (ref 0–6)
FIBRINOGEN PPP-MCNC: 478 MG/DL — SIGNIFICANT CHANGE UP (ref 290–520)
HCT VFR BLD CALC: 31.2 % — LOW (ref 34.5–45)
HCT VFR BLD CALC: 36.1 % — SIGNIFICANT CHANGE UP (ref 34.5–45)
HGB BLD-MCNC: 10.4 G/DL — LOW (ref 11.5–15.5)
HGB BLD-MCNC: 12.1 G/DL — SIGNIFICANT CHANGE UP (ref 11.5–15.5)
IMM GRANULOCYTES NFR BLD AUTO: 1.4 % — SIGNIFICANT CHANGE UP (ref 0–1.5)
INR BLD: 1.02 RATIO — SIGNIFICANT CHANGE UP (ref 0.88–1.16)
LYMPHOCYTES # BLD AUTO: 1.43 K/UL — SIGNIFICANT CHANGE UP (ref 1–3.3)
LYMPHOCYTES # BLD AUTO: 20.1 % — SIGNIFICANT CHANGE UP (ref 13–44)
MCHC RBC-ENTMCNC: 29.6 PG — SIGNIFICANT CHANGE UP (ref 27–34)
MCHC RBC-ENTMCNC: 29.9 PG — SIGNIFICANT CHANGE UP (ref 27–34)
MCHC RBC-ENTMCNC: 33.3 GM/DL — SIGNIFICANT CHANGE UP (ref 32–36)
MCHC RBC-ENTMCNC: 33.5 GM/DL — SIGNIFICANT CHANGE UP (ref 32–36)
MCV RBC AUTO: 88.9 FL — SIGNIFICANT CHANGE UP (ref 80–100)
MCV RBC AUTO: 89.1 FL — SIGNIFICANT CHANGE UP (ref 80–100)
MONOCYTES # BLD AUTO: 0.6 K/UL — SIGNIFICANT CHANGE UP (ref 0–0.9)
MONOCYTES NFR BLD AUTO: 8.5 % — SIGNIFICANT CHANGE UP (ref 2–14)
NEUTROPHILS # BLD AUTO: 4.88 K/UL — SIGNIFICANT CHANGE UP (ref 1.8–7.4)
NEUTROPHILS NFR BLD AUTO: 68.8 % — SIGNIFICANT CHANGE UP (ref 43–77)
NRBC # BLD: 0 /100 WBCS — SIGNIFICANT CHANGE UP (ref 0–0)
NRBC # BLD: 0 /100 WBCS — SIGNIFICANT CHANGE UP (ref 0–0)
PLATELET # BLD AUTO: 139 K/UL — LOW (ref 150–400)
PLATELET # BLD AUTO: 148 K/UL — LOW (ref 150–400)
PROTHROM AB SERPL-ACNC: 12.2 SEC — SIGNIFICANT CHANGE UP (ref 10.6–13.6)
RBC # BLD: 3.51 M/UL — LOW (ref 3.8–5.2)
RBC # BLD: 4.05 M/UL — SIGNIFICANT CHANGE UP (ref 3.8–5.2)
RBC # FLD: 14.4 % — SIGNIFICANT CHANGE UP (ref 10.3–14.5)
RBC # FLD: 14.5 % — SIGNIFICANT CHANGE UP (ref 10.3–14.5)
SARS-COV-2 IGG+IGM SERPL QL IA: >250 U/ML — HIGH
SARS-COV-2 IGG+IGM SERPL QL IA: POSITIVE
T PALLIDUM AB TITR SER: NEGATIVE — SIGNIFICANT CHANGE UP
WBC # BLD: 12.25 K/UL — HIGH (ref 3.8–10.5)
WBC # BLD: 7.1 K/UL — SIGNIFICANT CHANGE UP (ref 3.8–10.5)
WBC # FLD AUTO: 12.25 K/UL — HIGH (ref 3.8–10.5)
WBC # FLD AUTO: 7.1 K/UL — SIGNIFICANT CHANGE UP (ref 3.8–10.5)

## 2021-08-07 PROCEDURE — 59514 CESAREAN DELIVERY ONLY: CPT | Mod: AS,U9

## 2021-08-07 RX ORDER — OXYCODONE HYDROCHLORIDE 5 MG/1
5 TABLET ORAL
Refills: 0 | Status: DISCONTINUED | OUTPATIENT
Start: 2021-08-07 | End: 2021-08-09

## 2021-08-07 RX ORDER — TETANUS TOXOID, REDUCED DIPHTHERIA TOXOID AND ACELLULAR PERTUSSIS VACCINE, ADSORBED 5; 2.5; 8; 8; 2.5 [IU]/.5ML; [IU]/.5ML; UG/.5ML; UG/.5ML; UG/.5ML
0.5 SUSPENSION INTRAMUSCULAR ONCE
Refills: 0 | Status: DISCONTINUED | OUTPATIENT
Start: 2021-08-07 | End: 2021-08-09

## 2021-08-07 RX ORDER — OXYTOCIN 10 UNIT/ML
333.33 VIAL (ML) INJECTION
Qty: 20 | Refills: 0 | Status: DISCONTINUED | OUTPATIENT
Start: 2021-08-07 | End: 2021-08-09

## 2021-08-07 RX ORDER — ACETAMINOPHEN 500 MG
975 TABLET ORAL EVERY 6 HOURS
Refills: 0 | Status: DISCONTINUED | OUTPATIENT
Start: 2021-08-07 | End: 2021-08-09

## 2021-08-07 RX ORDER — MORPHINE SULFATE 50 MG/1
2 CAPSULE, EXTENDED RELEASE ORAL ONCE
Refills: 0 | Status: DISCONTINUED | OUTPATIENT
Start: 2021-08-07 | End: 2021-08-08

## 2021-08-07 RX ORDER — LANOLIN
1 OINTMENT (GRAM) TOPICAL EVERY 6 HOURS
Refills: 0 | Status: DISCONTINUED | OUTPATIENT
Start: 2021-08-07 | End: 2021-08-09

## 2021-08-07 RX ORDER — OXYCODONE HYDROCHLORIDE 5 MG/1
5 TABLET ORAL ONCE
Refills: 0 | Status: DISCONTINUED | OUTPATIENT
Start: 2021-08-07 | End: 2021-08-09

## 2021-08-07 RX ORDER — SIMETHICONE 80 MG/1
80 TABLET, CHEWABLE ORAL EVERY 4 HOURS
Refills: 0 | Status: DISCONTINUED | OUTPATIENT
Start: 2021-08-07 | End: 2021-08-09

## 2021-08-07 RX ORDER — ONDANSETRON 8 MG/1
4 TABLET, FILM COATED ORAL EVERY 6 HOURS
Refills: 0 | Status: DISCONTINUED | OUTPATIENT
Start: 2021-08-07 | End: 2021-08-09

## 2021-08-07 RX ORDER — DIPHENHYDRAMINE HCL 50 MG
25 CAPSULE ORAL EVERY 6 HOURS
Refills: 0 | Status: DISCONTINUED | OUTPATIENT
Start: 2021-08-07 | End: 2021-08-09

## 2021-08-07 RX ORDER — ACETAMINOPHEN 500 MG
1000 TABLET ORAL EVERY 6 HOURS
Refills: 0 | Status: COMPLETED | OUTPATIENT
Start: 2021-08-07 | End: 2021-08-08

## 2021-08-07 RX ORDER — HEPARIN SODIUM 5000 [USP'U]/ML
5000 INJECTION INTRAVENOUS; SUBCUTANEOUS EVERY 12 HOURS
Refills: 0 | Status: DISCONTINUED | OUTPATIENT
Start: 2021-08-07 | End: 2021-08-09

## 2021-08-07 RX ORDER — CEFAZOLIN SODIUM 1 G
2000 VIAL (EA) INJECTION EVERY 8 HOURS
Refills: 0 | Status: COMPLETED | OUTPATIENT
Start: 2021-08-07 | End: 2021-08-08

## 2021-08-07 RX ORDER — NALOXONE HYDROCHLORIDE 4 MG/.1ML
0.1 SPRAY NASAL
Refills: 0 | Status: DISCONTINUED | OUTPATIENT
Start: 2021-08-07 | End: 2021-08-09

## 2021-08-07 RX ORDER — SODIUM CHLORIDE 9 MG/ML
1000 INJECTION, SOLUTION INTRAVENOUS
Refills: 0 | Status: DISCONTINUED | OUTPATIENT
Start: 2021-08-07 | End: 2021-08-07

## 2021-08-07 RX ORDER — MAGNESIUM HYDROXIDE 400 MG/1
30 TABLET, CHEWABLE ORAL
Refills: 0 | Status: DISCONTINUED | OUTPATIENT
Start: 2021-08-07 | End: 2021-08-09

## 2021-08-07 RX ORDER — CITRIC ACID/SODIUM CITRATE 300-500 MG
15 SOLUTION, ORAL ORAL EVERY 6 HOURS
Refills: 0 | Status: DISCONTINUED | OUTPATIENT
Start: 2021-08-07 | End: 2021-08-07

## 2021-08-07 RX ORDER — ACETAMINOPHEN 500 MG
975 TABLET ORAL EVERY 6 HOURS
Refills: 0 | Status: COMPLETED | OUTPATIENT
Start: 2021-08-07 | End: 2022-07-06

## 2021-08-07 RX ORDER — NALBUPHINE HYDROCHLORIDE 10 MG/ML
2.5 INJECTION, SOLUTION INTRAMUSCULAR; INTRAVENOUS; SUBCUTANEOUS EVERY 6 HOURS
Refills: 0 | Status: DISCONTINUED | OUTPATIENT
Start: 2021-08-07 | End: 2021-08-09

## 2021-08-07 RX ORDER — SODIUM CHLORIDE 9 MG/ML
1000 INJECTION, SOLUTION INTRAVENOUS
Refills: 0 | Status: DISCONTINUED | OUTPATIENT
Start: 2021-08-07 | End: 2021-08-09

## 2021-08-07 RX ORDER — KETOROLAC TROMETHAMINE 30 MG/ML
30 SYRINGE (ML) INJECTION EVERY 6 HOURS
Refills: 0 | Status: DISCONTINUED | OUTPATIENT
Start: 2021-08-07 | End: 2021-08-09

## 2021-08-07 RX ORDER — LEVOTHYROXINE SODIUM 125 MCG
125 TABLET ORAL DAILY
Refills: 0 | Status: DISCONTINUED | OUTPATIENT
Start: 2021-08-07 | End: 2021-08-09

## 2021-08-07 RX ORDER — DEXAMETHASONE 0.5 MG/5ML
4 ELIXIR ORAL EVERY 6 HOURS
Refills: 0 | Status: DISCONTINUED | OUTPATIENT
Start: 2021-08-07 | End: 2021-08-09

## 2021-08-07 RX ADMIN — Medication 975 MILLIGRAM(S): at 21:35

## 2021-08-07 RX ADMIN — Medication 400 MILLIGRAM(S): at 08:33

## 2021-08-07 RX ADMIN — Medication 0.2 MILLIGRAM(S): at 08:59

## 2021-08-07 RX ADMIN — Medication 100 MILLIGRAM(S): at 22:11

## 2021-08-07 RX ADMIN — Medication 975 MILLIGRAM(S): at 21:04

## 2021-08-07 RX ADMIN — HEPARIN SODIUM 5000 UNIT(S): 5000 INJECTION INTRAVENOUS; SUBCUTANEOUS at 16:00

## 2021-08-07 RX ADMIN — Medication 125 MICROGRAM(S): at 10:15

## 2021-08-07 RX ADMIN — Medication 975 MILLIGRAM(S): at 15:00

## 2021-08-07 RX ADMIN — Medication 30 MILLIGRAM(S): at 23:52

## 2021-08-07 RX ADMIN — Medication 30 MILLIGRAM(S): at 17:26

## 2021-08-07 RX ADMIN — Medication 100 MILLIGRAM(S): at 15:34

## 2021-08-07 NOTE — OB PROVIDER H&P - PROBLEM SELECTOR PLAN 1
admit  routine labs and COVID PCR  IV acces and IV fluids  Bicitra  Cont efm/toco  Anesthesia consult  Expectant .  d/w Dr. Lozano.  TRISHA Corbin

## 2021-08-07 NOTE — OB RN INTRAOPERATIVE NOTE - NSSELHIDDEN_OBGYN_ALL_OB_FT
[NS_DeliveryAttending1_OBGYN_ALL_OB_FT:[email or web address]=],[NS_DeliveryRN_OBGYN_ALL_OB_FT:[id number]==] [NS_DeliveryAttending1_OBGYN_ALL_OB_FT:[email or web address]=],[NS_DeliveryRN_OBGYN_ALL_OB_FT:[id number]==],[NS_DeliveryAssist1_OBGYN_ALL_OB_FT:[email or web address]=]

## 2021-08-07 NOTE — PRE-ANESTHESIA EVALUATION ADULT - NSANTHOSAYNRD_GEN_A_CORE
No. ANDREZ screening performed.  STOP BANG Legend: 0-2 = LOW Risk; 3-4 = INTERMEDIATE Risk; 5-8 = HIGH Risk

## 2021-08-07 NOTE — OB PROVIDER LABOR PROGRESS NOTE - ASSESSMENT
the patient presented at 6 cm , with ruptured membranes and in labor. vertex at -3. there was no progress at all over the next couple of hours despite frequebnt contractions. decision made to do section for failed TOLAC

## 2021-08-07 NOTE — OB PROVIDER DELIVERY SUMMARY - NSPROVIDERDELIVERYNOTE_OBGYN_ALL_OB_FT
viable allan apgar 8/9 weight 8-1, brisk bleeding from the left angle of the uterine incision, secured in the normal fashion, leading to a blood loss of 1200 cc. minimal scar tissue. viable infant apgar 8/9 weight 8-1, brisk bleeding from the left angle of the uterine incision, secured in the normal fashion, leading to a blood loss of 1200 cc. minimal scar tissue.  EBL: 1200  QBL: 1101  UO: 600  IVF: 2200  Normal Uterus, tubes and ovaries

## 2021-08-07 NOTE — BRIEF OPERATIVE NOTE - OPERATION/FINDINGS
viable infant apgar 8/9 weight 8-1, brisk bleeding from the left angle of the uterine incision, secured in the normal fashion, leading to a blood loss of 1200 cc. minimal scar tissue.  EBL: 1200  QBL: 1101  UO: 600  IVF: 2200  Normal Uterus, tubes and ovaries

## 2021-08-07 NOTE — OB RN DELIVERY SUMMARY - NS_SEPSISRSKCALC_OBGYN_ALL_OB_FT
No temperature has been documented for this patient in CPN or on the OB Flowsheet. Ensure the highest temperature during labor was documented on the OB Flowsheet.  Rupture of membranes must be entered above.  GBS status in the 'Prenatal Lab tests/results section' on the OB RN Patient Profile must be documented.   No temperature has been documented for this patient in CPN or on the OB Flowsheet. Ensure the highest temperature during labor was documented on the OB Flowsheet.  Rupture of membranes must be entered above.   EOS calculated successfully. EOS Risk Factor: 0.5/1000 live births ([facility] national incidence); GA=41w;Temp=98.6; ROM=6.183; GBS='Negative'; Antibiotics='No antibiotics or any antibiotics < 2 hrs prior to birth'

## 2021-08-07 NOTE — OB PROVIDER H&P - NSHPPHYSICALEXAM_GEN_ALL_CORE
ICU Vital Signs Last 24 Hrs  HR: 103 (07 Aug 2021 03:33) (84 - 103)  BP: 113/64 (07 Aug 2021 03:23) (113/64 - 113/64)  RR: 18 (07 Aug 2021 03:23) (18 - 18)  SpO2: 97% (07 Aug 2021 03:33) (97% - 98%)  gen: mild pain w/ ctx  cards: Clear S1S2 RRR  pulm: CTA b/l  abd: soft, gravid  ve: 6/80/-3

## 2021-08-07 NOTE — OB RN DELIVERY SUMMARY - NSSELHIDDEN_OBGYN_ALL_OB_FT
[NS_DeliveryAttending1_OBGYN_ALL_OB_FT:[email or web address]=],[NS_DeliveryRN_OBGYN_ALL_OB_FT:[id number]==] [NS_DeliveryAttending1_OBGYN_ALL_OB_FT:[email or web address]=],[NS_DeliveryRN_OBGYN_ALL_OB_FT:[id number]==],[NS_DeliveryAssist1_OBGYN_ALL_OB_FT:[email or web address]=]

## 2021-08-07 NOTE — OB NEONATOLOGY/PEDIATRICIAN DELIVERY SUMMARY - NSPEDSNEONOTESA_OBGYN_ALL_OB_FT
Baby girl born at 40 6/7 wks via rpt c/s to a 25 y/o  mother who is A+ blood type, HBsAg neg, HIV neg, rubella immune, RPR neg, GBS neg as of . Maternal history of hypothyroid on 125mcg of synthroid. No significant prenatal history. SROM at 0130 with clear fluids. Baby emerged vigorous, crying, was w/d/s/s with APGARS of 8 / 9. Mom would like to breast and bottle feed. Parents would like the birth dose of Hep B.

## 2021-08-07 NOTE — OB PROVIDER H&P - HISTORY OF PRESENT ILLNESS
27 y/o  HOMERO 2021 @ 40.6 wks ga presenting w/ contractions at 1:30 am and SROM 1:30am. +FM. Denies vb.  GBS neg. EFW 8lb 1oz. PNC uncomplicated.     all: pcn -rash        sulfa - rash   meds: pnv            Synthroid 125 mcg daily    pmhx: hypothyroid  ob: '2018 - pLTCS FT 8lb   gyn: denies  sur - hernia repair   fhx: denies  soc: denies x 3.

## 2021-08-07 NOTE — OB PROVIDER DELIVERY SUMMARY - NSSELHIDDEN_OBGYN_ALL_OB_FT
[NS_DeliveryAttending1_OBGYN_ALL_OB_FT:[email or web address]=],[NS_DeliveryRN_OBGYN_ALL_OB_FT:[id number]==]

## 2021-08-08 LAB
HCT VFR BLD CALC: 25.4 % — LOW (ref 34.5–45)
HGB BLD-MCNC: 8.6 G/DL — LOW (ref 11.5–15.5)
MCHC RBC-ENTMCNC: 30.4 PG — SIGNIFICANT CHANGE UP (ref 27–34)
MCHC RBC-ENTMCNC: 33.9 GM/DL — SIGNIFICANT CHANGE UP (ref 32–36)
MCV RBC AUTO: 89.8 FL — SIGNIFICANT CHANGE UP (ref 80–100)
NRBC # BLD: 0 /100 WBCS — SIGNIFICANT CHANGE UP (ref 0–0)
PLATELET # BLD AUTO: 115 K/UL — LOW (ref 150–400)
RBC # BLD: 2.83 M/UL — LOW (ref 3.8–5.2)
RBC # FLD: 14.6 % — HIGH (ref 10.3–14.5)
WBC # BLD: 6.95 K/UL — SIGNIFICANT CHANGE UP (ref 3.8–10.5)
WBC # FLD AUTO: 6.95 K/UL — SIGNIFICANT CHANGE UP (ref 3.8–10.5)

## 2021-08-08 RX ADMIN — Medication 30 MILLIGRAM(S): at 06:41

## 2021-08-08 RX ADMIN — Medication 30 MILLIGRAM(S): at 05:42

## 2021-08-08 RX ADMIN — HEPARIN SODIUM 5000 UNIT(S): 5000 INJECTION INTRAVENOUS; SUBCUTANEOUS at 05:42

## 2021-08-08 RX ADMIN — Medication 100 MILLIGRAM(S): at 13:51

## 2021-08-08 RX ADMIN — Medication 975 MILLIGRAM(S): at 02:42

## 2021-08-08 RX ADMIN — Medication 975 MILLIGRAM(S): at 10:39

## 2021-08-08 RX ADMIN — Medication 30 MILLIGRAM(S): at 12:30

## 2021-08-08 RX ADMIN — Medication 100 MILLIGRAM(S): at 05:42

## 2021-08-08 RX ADMIN — Medication 975 MILLIGRAM(S): at 03:34

## 2021-08-08 RX ADMIN — Medication 125 MICROGRAM(S): at 05:42

## 2021-08-08 RX ADMIN — Medication 30 MILLIGRAM(S): at 00:07

## 2021-08-08 RX ADMIN — Medication 30 MILLIGRAM(S): at 18:12

## 2021-08-08 RX ADMIN — Medication 975 MILLIGRAM(S): at 21:50

## 2021-08-08 RX ADMIN — HEPARIN SODIUM 5000 UNIT(S): 5000 INJECTION INTRAVENOUS; SUBCUTANEOUS at 18:12

## 2021-08-08 RX ADMIN — Medication 975 MILLIGRAM(S): at 16:29

## 2021-08-08 RX ADMIN — Medication 975 MILLIGRAM(S): at 09:50

## 2021-08-08 RX ADMIN — Medication 975 MILLIGRAM(S): at 21:19

## 2021-08-09 ENCOUNTER — TRANSCRIPTION ENCOUNTER (OUTPATIENT)
Age: 26
End: 2021-08-09

## 2021-08-09 VITALS
TEMPERATURE: 98 F | SYSTOLIC BLOOD PRESSURE: 100 MMHG | RESPIRATION RATE: 18 BRPM | DIASTOLIC BLOOD PRESSURE: 63 MMHG | HEART RATE: 73 BPM | OXYGEN SATURATION: 96 %

## 2021-08-09 PROCEDURE — 85730 THROMBOPLASTIN TIME PARTIAL: CPT

## 2021-08-09 PROCEDURE — 85610 PROTHROMBIN TIME: CPT

## 2021-08-09 PROCEDURE — 59050 FETAL MONITOR W/REPORT: CPT

## 2021-08-09 PROCEDURE — G0463: CPT

## 2021-08-09 PROCEDURE — 86769 SARS-COV-2 COVID-19 ANTIBODY: CPT

## 2021-08-09 PROCEDURE — 85025 COMPLETE CBC W/AUTO DIFF WBC: CPT

## 2021-08-09 PROCEDURE — 86780 TREPONEMA PALLIDUM: CPT

## 2021-08-09 PROCEDURE — 59025 FETAL NON-STRESS TEST: CPT

## 2021-08-09 PROCEDURE — 85027 COMPLETE CBC AUTOMATED: CPT

## 2021-08-09 PROCEDURE — 85384 FIBRINOGEN ACTIVITY: CPT

## 2021-08-09 RX ORDER — ACETAMINOPHEN 500 MG
3 TABLET ORAL
Qty: 0 | Refills: 0 | DISCHARGE
Start: 2021-08-09

## 2021-08-09 RX ORDER — LEVOTHYROXINE SODIUM 125 MCG
1 TABLET ORAL
Qty: 0 | Refills: 0 | DISCHARGE

## 2021-08-09 RX ADMIN — Medication 975 MILLIGRAM(S): at 03:47

## 2021-08-09 RX ADMIN — Medication 975 MILLIGRAM(S): at 03:02

## 2021-08-09 RX ADMIN — Medication 975 MILLIGRAM(S): at 08:54

## 2021-08-09 RX ADMIN — Medication 30 MILLIGRAM(S): at 00:30

## 2021-08-09 RX ADMIN — Medication 125 MICROGRAM(S): at 06:16

## 2021-08-09 RX ADMIN — Medication 975 MILLIGRAM(S): at 09:40

## 2021-08-09 RX ADMIN — HEPARIN SODIUM 5000 UNIT(S): 5000 INJECTION INTRAVENOUS; SUBCUTANEOUS at 06:09

## 2021-08-09 RX ADMIN — Medication 30 MILLIGRAM(S): at 06:47

## 2021-08-09 RX ADMIN — Medication 30 MILLIGRAM(S): at 06:09

## 2021-08-09 RX ADMIN — Medication 30 MILLIGRAM(S): at 00:57

## 2021-08-09 RX ADMIN — Medication 30 MILLIGRAM(S): at 11:13

## 2021-08-09 NOTE — PROGRESS NOTE ADULT - SUBJECTIVE AND OBJECTIVE BOX
OB Progress Note:  Delivery, POD#1    S: 25yo POD#1 s/p LTCS . Her pain is well controlled. She is tolerating a regular diet and passing flatus. Denies N/V. Denies CP/SOB/lightheadedness/dizziness.   She is ambulating without difficulty.   Voiding spontaneously.     O:   Vital Signs Last 24 Hrs  T(C): 36.8 (08 Aug 2021 05:00), Max: 37.2 (07 Aug 2021 11:45)  T(F): 98.2 (08 Aug 2021 05:00), Max: 99 (07 Aug 2021 11:45)  HR: 64 (08 Aug 2021 05:00) (62 - 100)  BP: 91/51 (08 Aug 2021 05:00) (91/51 - 111/57)  BP(mean): 78 (07 Aug 2021 13:45) (71 - 79)  RR: 17 (08 Aug 2021 05:00) (15 - 18)  SpO2: 97% (08 Aug 2021 05:00) (95% - 98%)    Labs:  Blood type: A Positive  Rubella IgG: RPR: Negative                          10.4<L>   12.25<H> >-----------< 139<L>    (  @ 12:50 )             31.2<L>                        12.1   7.10 >-----------< 148<L>    (  @ 04:12 )             36.1                  PE:  General: NAD  Abdomen: Mildly distended, appropriately tender, incision c/d/i.  Extremities: No erythema, no pitting edema  
Postpartum Note-  Section POD#2      S: Patient w/o complaints, pain is controlled.  Pt is OOB, tolerating PO, passing flatus, and voiding. Vaginal bleeding minimal to moderate.       O:  Vital Signs Last 24 Hrs  T(C): 36.7 (09 Aug 2021 06:15), Max: 36.9 (08 Aug 2021 12:37)  T(F): 98.1 (09 Aug 2021 06:15), Max: 98.4 (08 Aug 2021 12:37)  HR: 73 (09 Aug 2021 06:15) (73 - 96)  BP: 100/63 (09 Aug 2021 06:15) (92/60 - 107/70)  BP(mean): 82 (09 Aug 2021 00:49) (72 - 82)  RR: 18 (09 Aug 2021 06:15) (18 - 18)  SpO2: 96% (09 Aug 2021 06:15) (96% - 98%)        LABS:               8.6    6.95  )-----------( 115      (  @ 07:25 )             25.4                10.4   12.25 )-----------( 139      (  @ 12:50 )             31.2                12.1   7.10  )-----------( 148      (  @ 04:12 )             36.1                           
OB Postpartum Note:  Delivery    S: 27yo  now POD #2 s/p LTCS. Her pain is well controlled. She is tolerating a regular diet and is passing flatus. Voiding spontaneously and ambulating without difficulty. Denies N/V. Denies CP/SOB/lightheadedness/dizziness.     O:   Vitals:  Vital Signs Last 24 Hrs  T(C): 36.7 (09 Aug 2021 06:15), Max: 36.9 (08 Aug 2021 12:37)  T(F): 98.1 (09 Aug 2021 06:15), Max: 98.4 (08 Aug 2021 12:37)  HR: 73 (09 Aug 2021 06:15) (73 - 96)  BP: 100/63 (09 Aug 2021 06:15) (92/60 - 107/70)  BP(mean): 82 (09 Aug 2021 00:49) (72 - 82)  RR: 18 (09 Aug 2021 06:15) (18 - 18)  SpO2: 96% (09 Aug 2021 06:15) (96% - 98%)    MEDICATIONS  (STANDING):  acetaminophen   Tablet .. 975 milliGRAM(s) Oral every 6 hours  diphtheria/tetanus/pertussis (acellular) Vaccine (ADAcel) 0.5 milliLiter(s) IntraMuscular once  heparin   Injectable 5000 Unit(s) SubCutaneous every 12 hours  ketorolac   Injectable 30 milliGRAM(s) IV Push every 6 hours  lactated ringers. 1000 milliLiter(s) (125 mL/Hr) IV Continuous <Continuous>  levothyroxine 125 MICROGram(s) Oral daily  oxytocin Infusion 333.333 milliUNIT(s)/Min (1000 mL/Hr) IV Continuous <Continuous>  oxytocin Infusion 333.333 milliUNIT(s)/Min (1000 mL/Hr) IV Continuous <Continuous>    MEDICATIONS  (PRN):  dexAMETHasone  Injectable 4 milliGRAM(s) IV Push every 6 hours PRN Nausea  diphenhydrAMINE 25 milliGRAM(s) Oral every 6 hours PRN Pruritus  lanolin Ointment 1 Application(s) Topical every 6 hours PRN Sore Nipples  magnesium hydroxide Suspension 30 milliLiter(s) Oral two times a day PRN Constipation  nalbuphine Injectable 2.5 milliGRAM(s) IV Push every 6 hours PRN Pruritus  naloxone Injectable 0.1 milliGRAM(s) IV Push every 3 minutes PRN For ANY of the following changes in patient status:  A. Breaths Per Minute LESS THAN 10, B. Oxygen saturation LESS THAN 90%, C. Sedation score of 6 for Stop After: 4 Times  ondansetron Injectable 4 milliGRAM(s) IV Push every 6 hours PRN Nausea  oxyCODONE    IR 5 milliGRAM(s) Oral every 3 hours PRN Moderate to Severe Pain (4-10)  oxyCODONE    IR 5 milliGRAM(s) Oral once PRN Moderate to Severe Pain (4-10)  simethicone 80 milliGRAM(s) Chew every 4 hours PRN Gas      Labs:  Blood type: A Positive  Rubella IgG: RPR: Negative                          8.6<L>   6.95 >-----------< 115<L>    (  @ 07:25 )             25.4<L>                        10.4<L>   12.25<H> >-----------< 139<L>    (  @ 12:50 )             31.2<L>                        12.1   7.10 >-----------< 148<L>    (  @ 04:12 )             36.1                  PE:  General: NAD, patient resting comfortably in bed  Abdomen: Mildly distended, appropriately tender. No rebound tenderness or guarding. Incision c/d/i.  Extremities: SCDs in place, no erythema

## 2021-08-09 NOTE — DISCHARGE NOTE OB - CARE PLAN
Principal Discharge DX:	 delivery delivered  Goal:	recovery  Assessment and plan of treatment:	return to baseline health, regular diet, pain control, follow up with Dr Lozano

## 2021-08-09 NOTE — PROGRESS NOTE ADULT - PROBLEM SELECTOR PLAN 1
- Continue regular diet.  - Increase ambulation as tolerated.   - Standing Ibuprofen and Acetaminophen for pain, Oxycodone available PRN for severe pain.  - DVT prophylaxis with Heparin 5000u BID    Amyeo Jereen PGY-1

## 2021-08-09 NOTE — DISCHARGE NOTE OB - MEDICATION SUMMARY - MEDICATIONS TO TAKE
I will START or STAY ON the medications listed below when I get home from the hospital:    acetaminophen 325 mg oral tablet  -- 3 tab(s) by mouth every 6 hours  -- Indication: For mild pain

## 2021-08-09 NOTE — DISCHARGE NOTE OB - PATIENT PORTAL LINK FT
You can access the FollowMyHealth Patient Portal offered by [facility] by registering at the following website: [email or web address]. By joining [employer]’s FollowMyHealth portal, you will also be able to view your health information using other applications (apps) compatible with our system.

## 2021-08-09 NOTE — DISCHARGE NOTE OB - CARE PROVIDER_API CALL
Lee Ann Lozano  OBSTETRICS AND GYNECOLOGY  [address], [address]  [place] [address]  Phone: [phone]  Fax: [phone]  Follow Up Time:

## 2022-01-31 NOTE — PROGRESS NOTE ADULT - ASSESSMENT
Head,  normocephalic,  atraumatic,  Face,  Face within normal limits,  Ears,  External ears within normal limits,  Nose/Nasopharynx,  External nose  normal appearance,  nares patent,  no nasal discharge,  Mouth and Throat,  Oral cavity appearance normal,  Breath odor normal,  Lips,  Appearance normal
Per Dr. Lozano-  Abdomen: Soft, appropriately tender, non distended, fundus firm.  Incision: Clean/dry/ intact. -erythema    -ecchymosis.     Lochia WNL  Ext: Neg edema, Neg calf tenderness b/l.
A/P:   25yo POD#1 s/p LTCS.  Patient is stable and doing well post-operatively.    - Continue regular diet.  - Increase ambulation.  - Continue motrin, tylenol, oxycodone PRN for pain control  - F/u AM CBC    Colleen Martins MD PGY1
A/P: 27yo   POD #2 s/p LTCS.  Patient is stable and doing well post-operatively.

## 2022-11-05 NOTE — OB PROVIDER TRIAGE NOTE - NS_PELVICEXAM_OBGYN_ALL_OB
[facility] EMERGENCY DEPT  [address]  [facility] [address]  Phone: [phone]  Fax: [phone]    November 6, 2022        Priscila Betancourt  [address] [address] [address]          To whom it may concern:    RE: Priscila Betancourt    Please excuse Nyronny from work util 11/8/2022 as she was seen in our Emergency Department.     Please contact me for questions or concerns.      Sincerely,  Michell Carrasco, RN-BSN         
Yes

## 2023-12-26 ENCOUNTER — ASOB RESULT (OUTPATIENT)
Age: 28
End: 2023-12-26

## 2023-12-26 ENCOUNTER — APPOINTMENT (OUTPATIENT)
Dept: ANTEPARTUM | Facility: CLINIC | Age: 28
End: 2023-12-26
Payer: COMMERCIAL

## 2023-12-26 PROCEDURE — 76811 OB US DETAILED SNGL FETUS: CPT

## 2024-03-22 ENCOUNTER — APPOINTMENT (OUTPATIENT)
Dept: ANTEPARTUM | Facility: CLINIC | Age: 29
End: 2024-03-22

## 2024-03-27 ENCOUNTER — APPOINTMENT (OUTPATIENT)
Dept: ANTEPARTUM | Facility: CLINIC | Age: 29
End: 2024-03-27
Payer: COMMERCIAL

## 2024-03-27 ENCOUNTER — ASOB RESULT (OUTPATIENT)
Age: 29
End: 2024-03-27

## 2024-03-27 PROCEDURE — 76816 OB US FOLLOW-UP PER FETUS: CPT

## 2024-03-27 PROCEDURE — 76819 FETAL BIOPHYS PROFIL W/O NST: CPT

## 2024-05-01 ENCOUNTER — OUTPATIENT (OUTPATIENT)
Dept: OUTPATIENT SERVICES | Facility: HOSPITAL | Age: 29
LOS: 1 days | End: 2024-05-01
Payer: COMMERCIAL

## 2024-05-01 VITALS
HEART RATE: 75 BPM | SYSTOLIC BLOOD PRESSURE: 115 MMHG | DIASTOLIC BLOOD PRESSURE: 78 MMHG | OXYGEN SATURATION: 100 % | WEIGHT: 153 LBS | RESPIRATION RATE: 18 BRPM | TEMPERATURE: 97 F | HEIGHT: 65 IN

## 2024-05-01 DIAGNOSIS — Z01.818 ENCOUNTER FOR OTHER PREPROCEDURAL EXAMINATION: ICD-10-CM

## 2024-05-01 DIAGNOSIS — Z98.891 HISTORY OF UTERINE SCAR FROM PREVIOUS SURGERY: ICD-10-CM

## 2024-05-01 DIAGNOSIS — Z29.9 ENCOUNTER FOR PROPHYLACTIC MEASURES, UNSPECIFIED: ICD-10-CM

## 2024-05-01 DIAGNOSIS — Z98.890 OTHER SPECIFIED POSTPROCEDURAL STATES: Chronic | ICD-10-CM

## 2024-05-01 DIAGNOSIS — E03.9 HYPOTHYROIDISM, UNSPECIFIED: ICD-10-CM

## 2024-05-01 DIAGNOSIS — Z98.891 HISTORY OF UTERINE SCAR FROM PREVIOUS SURGERY: Chronic | ICD-10-CM

## 2024-05-01 DIAGNOSIS — O34.211 MATERNAL CARE FOR LOW TRANSVERSE SCAR FROM PREVIOUS CESAREAN DELIVERY: ICD-10-CM

## 2024-05-01 LAB
ANION GAP SERPL CALC-SCNC: 11 MMOL/L — SIGNIFICANT CHANGE UP (ref 5–17)
BUN SERPL-MCNC: 5 MG/DL — LOW (ref 7–23)
CALCIUM SERPL-MCNC: 8.8 MG/DL — SIGNIFICANT CHANGE UP (ref 8.4–10.5)
CHLORIDE SERPL-SCNC: 102 MMOL/L — SIGNIFICANT CHANGE UP (ref 96–108)
CO2 SERPL-SCNC: 23 MMOL/L — SIGNIFICANT CHANGE UP (ref 22–31)
CREAT SERPL-MCNC: 0.39 MG/DL — LOW (ref 0.5–1.3)
EGFR: 138 ML/MIN/1.73M2 — SIGNIFICANT CHANGE UP
GLUCOSE SERPL-MCNC: 83 MG/DL — SIGNIFICANT CHANGE UP (ref 70–99)
HCT VFR BLD CALC: 34.8 % — SIGNIFICANT CHANGE UP (ref 34.5–45)
HGB BLD-MCNC: 11.2 G/DL — LOW (ref 11.5–15.5)
MCHC RBC-ENTMCNC: 28.9 PG — SIGNIFICANT CHANGE UP (ref 27–34)
MCHC RBC-ENTMCNC: 32.2 GM/DL — SIGNIFICANT CHANGE UP (ref 32–36)
MCV RBC AUTO: 89.7 FL — SIGNIFICANT CHANGE UP (ref 80–100)
NRBC # BLD: 0 /100 WBCS — SIGNIFICANT CHANGE UP (ref 0–0)
PLATELET # BLD AUTO: 125 K/UL — LOW (ref 150–400)
POTASSIUM SERPL-MCNC: 3.9 MMOL/L — SIGNIFICANT CHANGE UP (ref 3.5–5.3)
POTASSIUM SERPL-SCNC: 3.9 MMOL/L — SIGNIFICANT CHANGE UP (ref 3.5–5.3)
RBC # BLD: 3.88 M/UL — SIGNIFICANT CHANGE UP (ref 3.8–5.2)
RBC # FLD: 14.6 % — HIGH (ref 10.3–14.5)
SODIUM SERPL-SCNC: 136 MMOL/L — SIGNIFICANT CHANGE UP (ref 135–145)
WBC # BLD: 6.06 K/UL — SIGNIFICANT CHANGE UP (ref 3.8–10.5)
WBC # FLD AUTO: 6.06 K/UL — SIGNIFICANT CHANGE UP (ref 3.8–10.5)

## 2024-05-01 PROCEDURE — 80048 BASIC METABOLIC PNL TOTAL CA: CPT

## 2024-05-01 PROCEDURE — G0463: CPT

## 2024-05-01 PROCEDURE — 85027 COMPLETE CBC AUTOMATED: CPT

## 2024-05-01 PROCEDURE — 86900 BLOOD TYPING SEROLOGIC ABO: CPT

## 2024-05-01 PROCEDURE — 86901 BLOOD TYPING SEROLOGIC RH(D): CPT

## 2024-05-01 PROCEDURE — 86850 RBC ANTIBODY SCREEN: CPT

## 2024-05-01 RX ORDER — OXYTOCIN 10 UNIT/ML
333.33 VIAL (ML) INJECTION
Qty: 20 | Refills: 0 | Status: COMPLETED | OUTPATIENT
Start: 2024-05-06 | End: 2024-05-06

## 2024-05-01 RX ORDER — SODIUM CHLORIDE 9 MG/ML
1000 INJECTION, SOLUTION INTRAVENOUS
Refills: 0 | Status: DISCONTINUED | OUTPATIENT
Start: 2024-05-06 | End: 2024-05-09

## 2024-05-01 RX ORDER — SODIUM CHLORIDE 9 MG/ML
1000 INJECTION, SOLUTION INTRAVENOUS
Refills: 0 | Status: DISCONTINUED | OUTPATIENT
Start: 2024-05-06 | End: 2024-05-06

## 2024-05-01 RX ORDER — CHLORHEXIDINE GLUCONATE 213 G/1000ML
1 SOLUTION TOPICAL DAILY
Refills: 0 | Status: DISCONTINUED | OUTPATIENT
Start: 2024-05-06 | End: 2024-05-06

## 2024-05-01 RX ORDER — GENTAMICIN SULFATE 40 MG/ML
320 VIAL (ML) INJECTION ONCE
Refills: 0 | Status: DISCONTINUED | OUTPATIENT
Start: 2024-05-06 | End: 2024-05-09

## 2024-05-01 NOTE — OB PST NOTE - NSHPPHYSICALEXAM_GEN_ALL_CORE
Neurological: Alert & Oriented x 3  Respiratory: CTA B/L, No wheezing/crackles/rhonchi  Cardiovascular: (+) S1 & S2, RRR  Gastrointestinal: soft, NT, nondistended, (+) BS  Genitourinary: voiding freely  Extremities: No pedal edema, No clubbing, No cyanosis  Skin:  normal skin color and pigmentation, no skin lesions Neurological: Alert & Oriented x 3  Respiratory: CTA B/L, No wheezing/crackles/rhonchi  Cardiovascular: (+) S1 & S2, RRR  Gastrointestinal: soft, NT, nondistended, (+) BS  Genitourinary: voiding freely  Extremities: No pedal edema

## 2024-05-01 NOTE — OB PST NOTE - ASSESSMENT

## 2024-05-01 NOTE — OB PST NOTE - NSHPREVIEWOFSYSTEMS_GEN_ALL_CORE
Denies any lightheadedness, dizziness, CP, palpitations or SOB  Denies any n/v, abdominal pain  Denies any vaginal bleeding or spotting

## 2024-05-01 NOTE — OB PST NOTE - NSICDXFAMILYHX_GEN_ALL_CORE_FT
FAMILY HISTORY:  No pertinent family history in first degree relatives     FAMILY HISTORY:  Father  Still living? Unknown  FHx: aortic dissection, Age at diagnosis: Age Unknown

## 2024-05-01 NOTE — OB PST NOTE - PROBLEM SELECTOR PLAN 1
Preop instructions and chlorhexidine given  C -section Hydration given  Labs CBC BMP T&S performed in PST  T&S DOS

## 2024-05-01 NOTE — OB PST NOTE - LAST ECHOCARDIOGRAM
Cardiac 2023- Performed due to Familal hx- normal Cardiac 2023- Performed due to Familial hx- normal Cardiac 2023- Performed due to Familial hx- normal as per pt

## 2024-05-01 NOTE — OB PST NOTE - LAST CARDIAC ANGIOGRAM/IMAGING
Cardiac 2023- Performed due to Familal hx- normal Cardiac 2023- Performed due to Familial hx- normal Cardiac CT 2023- Performed due to Familial hx- normal as per pt

## 2024-05-01 NOTE — OB PST NOTE - HISTORY OF PRESENT ILLNESS
This is a 29 year old female      Presenting to PST for Repeat  on 24 with Dr. Lozano This is a 29 year old female with past medical history of Hypothyroidism follows with endo every 6 months. Currently 38 weeks gestation, , presenting to PST for Repeat  on 24 with Dr. Lozano

## 2024-05-01 NOTE — OB PST NOTE - NSICDXPASTSURGICALHX_GEN_ALL_CORE_FT
PAST SURGICAL HISTORY:   delivery delivered x1     PAST SURGICAL HISTORY:   delivery delivered x1    History of rhinoplasty     S/P left inguinal hernia repair      PAST SURGICAL HISTORY:  History of  section     History of rhinoplasty     S/P left inguinal hernia repair

## 2024-05-05 ENCOUNTER — TRANSCRIPTION ENCOUNTER (OUTPATIENT)
Age: 29
End: 2024-05-05

## 2024-05-06 ENCOUNTER — INPATIENT (INPATIENT)
Facility: HOSPITAL | Age: 29
LOS: 2 days | Discharge: HOME | End: 2024-05-09
Attending: OBSTETRICS & GYNECOLOGY | Admitting: OBSTETRICS & GYNECOLOGY
Payer: COMMERCIAL

## 2024-05-06 VITALS — DIASTOLIC BLOOD PRESSURE: 56 MMHG | HEART RATE: 86 BPM | SYSTOLIC BLOOD PRESSURE: 118 MMHG

## 2024-05-06 DIAGNOSIS — Z98.890 OTHER SPECIFIED POSTPROCEDURAL STATES: Chronic | ICD-10-CM

## 2024-05-06 DIAGNOSIS — Z98.891 HISTORY OF UTERINE SCAR FROM PREVIOUS SURGERY: Chronic | ICD-10-CM

## 2024-05-06 DIAGNOSIS — O34.211 MATERNAL CARE FOR LOW TRANSVERSE SCAR FROM PREVIOUS CESAREAN DELIVERY: ICD-10-CM

## 2024-05-06 LAB
BLD GP AB SCN SERPL QL: NEGATIVE — SIGNIFICANT CHANGE UP
RH IG SCN BLD-IMP: POSITIVE — SIGNIFICANT CHANGE UP

## 2024-05-06 PROCEDURE — 59514 CESAREAN DELIVERY ONLY: CPT | Mod: AS,U7

## 2024-05-06 RX ORDER — OXYTOCIN 10 UNIT/ML
333.33 VIAL (ML) INJECTION
Qty: 20 | Refills: 0 | Status: DISCONTINUED | OUTPATIENT
Start: 2024-05-06 | End: 2024-05-09

## 2024-05-06 RX ORDER — SIMETHICONE 80 MG/1
80 TABLET, CHEWABLE ORAL EVERY 4 HOURS
Refills: 0 | Status: DISCONTINUED | OUTPATIENT
Start: 2024-05-06 | End: 2024-05-09

## 2024-05-06 RX ORDER — FAMOTIDINE 10 MG/ML
20 INJECTION INTRAVENOUS ONCE
Refills: 0 | Status: COMPLETED | OUTPATIENT
Start: 2024-05-06 | End: 2024-05-06

## 2024-05-06 RX ORDER — HEPARIN SODIUM 5000 [USP'U]/ML
5000 INJECTION INTRAVENOUS; SUBCUTANEOUS EVERY 12 HOURS
Refills: 0 | Status: DISCONTINUED | OUTPATIENT
Start: 2024-05-06 | End: 2024-05-09

## 2024-05-06 RX ORDER — MORPHINE SULFATE 50 MG/1
0.1 CAPSULE, EXTENDED RELEASE ORAL ONCE
Refills: 0 | Status: DISCONTINUED | OUTPATIENT
Start: 2024-05-06 | End: 2024-05-07

## 2024-05-06 RX ORDER — DIPHENHYDRAMINE HCL 50 MG
25 CAPSULE ORAL EVERY 6 HOURS
Refills: 0 | Status: DISCONTINUED | OUTPATIENT
Start: 2024-05-06 | End: 2024-05-09

## 2024-05-06 RX ORDER — CITRIC ACID/SODIUM CITRATE 300-500 MG
15 SOLUTION, ORAL ORAL ONCE
Refills: 0 | Status: COMPLETED | OUTPATIENT
Start: 2024-05-06 | End: 2024-05-06

## 2024-05-06 RX ORDER — LANOLIN
1 OINTMENT (GRAM) TOPICAL EVERY 6 HOURS
Refills: 0 | Status: DISCONTINUED | OUTPATIENT
Start: 2024-05-06 | End: 2024-05-09

## 2024-05-06 RX ORDER — ACETAMINOPHEN 500 MG
975 TABLET ORAL
Refills: 0 | Status: DISCONTINUED | OUTPATIENT
Start: 2024-05-06 | End: 2024-05-09

## 2024-05-06 RX ORDER — MAGNESIUM HYDROXIDE 400 MG/1
30 TABLET, CHEWABLE ORAL
Refills: 0 | Status: DISCONTINUED | OUTPATIENT
Start: 2024-05-06 | End: 2024-05-09

## 2024-05-06 RX ORDER — IBUPROFEN 200 MG
600 TABLET ORAL EVERY 6 HOURS
Refills: 0 | Status: COMPLETED | OUTPATIENT
Start: 2024-05-06 | End: 2025-04-04

## 2024-05-06 RX ORDER — SODIUM CHLORIDE 9 MG/ML
1000 INJECTION, SOLUTION INTRAVENOUS
Refills: 0 | Status: DISCONTINUED | OUTPATIENT
Start: 2024-05-06 | End: 2024-05-09

## 2024-05-06 RX ORDER — TETANUS TOXOID, REDUCED DIPHTHERIA TOXOID AND ACELLULAR PERTUSSIS VACCINE, ADSORBED 5; 2.5; 8; 8; 2.5 [IU]/.5ML; [IU]/.5ML; UG/.5ML; UG/.5ML; UG/.5ML
0.5 SUSPENSION INTRAMUSCULAR ONCE
Refills: 0 | Status: DISCONTINUED | OUTPATIENT
Start: 2024-05-06 | End: 2024-05-09

## 2024-05-06 RX ORDER — CEFAZOLIN SODIUM 1 G
2000 VIAL (EA) INJECTION EVERY 8 HOURS
Refills: 0 | Status: DISCONTINUED | OUTPATIENT
Start: 2024-05-06 | End: 2024-05-09

## 2024-05-06 RX ORDER — OXYCODONE HYDROCHLORIDE 5 MG/1
5 TABLET ORAL
Refills: 0 | Status: COMPLETED | OUTPATIENT
Start: 2024-05-06 | End: 2024-05-13

## 2024-05-06 RX ORDER — OXYCODONE HYDROCHLORIDE 5 MG/1
5 TABLET ORAL ONCE
Refills: 0 | Status: DISCONTINUED | OUTPATIENT
Start: 2024-05-06 | End: 2024-05-09

## 2024-05-06 RX ORDER — KETOROLAC TROMETHAMINE 30 MG/ML
30 SYRINGE (ML) INJECTION EVERY 6 HOURS
Refills: 0 | Status: DISCONTINUED | OUTPATIENT
Start: 2024-05-06 | End: 2024-05-07

## 2024-05-06 RX ADMIN — Medication 1000 MILLIUNIT(S)/MIN: at 19:54

## 2024-05-06 RX ADMIN — Medication 30 MILLIGRAM(S): at 22:43

## 2024-05-06 RX ADMIN — Medication 15 MILLILITER(S): at 14:54

## 2024-05-06 RX ADMIN — Medication 975 MILLIGRAM(S): at 21:30

## 2024-05-06 RX ADMIN — CHLORHEXIDINE GLUCONATE 1 APPLICATION(S): 213 SOLUTION TOPICAL at 14:54

## 2024-05-06 RX ADMIN — Medication 30 MILLIGRAM(S): at 23:40

## 2024-05-06 RX ADMIN — Medication 100 MILLIGRAM(S): at 22:43

## 2024-05-06 RX ADMIN — Medication 975 MILLIGRAM(S): at 21:03

## 2024-05-06 RX ADMIN — HEPARIN SODIUM 5000 UNIT(S): 5000 INJECTION INTRAVENOUS; SUBCUTANEOUS at 22:43

## 2024-05-06 RX ADMIN — Medication 1000 MILLIUNIT(S)/MIN: at 16:13

## 2024-05-06 RX ADMIN — SODIUM CHLORIDE 200 MILLILITER(S): 9 INJECTION, SOLUTION INTRAVENOUS at 14:54

## 2024-05-06 RX ADMIN — FAMOTIDINE 20 MILLIGRAM(S): 10 INJECTION INTRAVENOUS at 14:54

## 2024-05-06 NOTE — OB PROVIDER DELIVERY SUMMARY - NSLOWPPHRISK_OBGYN_A_OB
[place] Pregnancy/Less than or equal to 4 previous vaginal births/No known bleeding disorder/No history of postpartum hemorrhage/No other PPH risks indicated

## 2024-05-06 NOTE — OB PROVIDER H&P - NSWIC_OBGYN_ALL_OB
Josh 
No

## 2024-05-06 NOTE — OB PROVIDER DELIVERY SUMMARY - NSPROVIDERDELIVERYNOTE_OBGYN_ALL_OB_FT
rLTCS of viable female infant. Grossly normal uterus, tubes, ovaries. Single layer uterine closure. Excellent hemostasis. APGARS 9/9      IVF 2000  Uout 400    Dictation #: [id number]

## 2024-05-06 NOTE — OB RN PATIENT PROFILE - NSICDXFAMILYHX_GEN_ALL_CORE_FT
FAMILY HISTORY:  Father  Still living? Unknown  FHx: aortic dissection, Age at diagnosis: Age Unknown

## 2024-05-06 NOTE — OB PROVIDER H&P - NSFIRSTDATEVISIT_OBGYN_ALL_OB
I have reviewed this note, the presenting symptoms, and the Chief Complaint and the History of Present Illness as documented, with the other care provider(s) and nurses on the patient care team. I have also reviewed this patient's past medical/surgical history and social/family history as reviewed and listed in this electronic medical record.  See MDM.  --BMM
Unknown

## 2024-05-06 NOTE — OB RN DELIVERY SUMMARY - NS_SEPSISRSKCALC_OBGYN_ALL_OB_FT
EOS calculated successfully. EOS Risk Factor: 0.5/1000 live births ([facility] national incidence); GA=39w1d; Temp=98.24; ROM=0; GBS='Negative'; Antibiotics='No antibiotics or any antibiotics < 2 hrs prior to birth'

## 2024-05-06 NOTE — OB RN PATIENT PROFILE - CAREGIVER PHONE NUMBER
[General Appearance - Well Developed] : well developed [Normal Appearance] : normal appearance [Well Groomed] : well groomed [General Appearance - Well Nourished] : well nourished [No Deformities] : no deformities [Heart Rate And Rhythm] : heart rate and rhythm were normal [General Appearance - In No Acute Distress] : no acute distress [Heart Sounds] : normal S1 and S2 [] : no respiratory distress [Edema] : no peripheral edema present [Respiration, Rhythm And Depth] : normal respiratory rhythm and effort [Left Infraclavicular] : left infraclavicular area [Exaggerated Use Of Accessory Muscles For Inspiration] : no accessory muscle use [Dry] : dry [Clean] : clean [Well-Healed] : well-healed [Abdomen Soft] : soft [Palpable Crepitus] : no palpable crepitus [Bleeding] : no active bleeding [Foul Odor] : no foul smell [Purulent Drainage] : no purulent drainage [Serosanguineous Drainage] : no serosanquineous drainage [Serous Drainage] : no serous drainage [Erythema] : not erythematous [Warm] : not warm [Tender] : not tender [Indurated] : not indurated [Fluctuant] : not fluctuant [phone]

## 2024-05-06 NOTE — OB PROVIDER DELIVERY SUMMARY - NSSELHIDDEN_OBGYN_ALL_OB_FT
[NS_DeliveryAttending1_OBGYN_ALL_OB_FT:[email or web address]=],[NS_DeliveryAssist1_OBGYN_ALL_OB_FT:[id number]=],[NS_DeliveryRN_OBGYN_ALL_OB_FT:[id number]]

## 2024-05-06 NOTE — PRE-ANESTHESIA EVALUATION ADULT - NSATTENDATTESTRD_GEN_ALL_CORE
The patient has been re-examined and I agree with the above assessment or I updated with my findings.
2 seconds or less

## 2024-05-06 NOTE — OB RN PATIENT PROFILE - NSICDXPASTSURGICALHX_GEN_ALL_CORE_FT
PAST SURGICAL HISTORY:  History of  section     History of rhinoplasty     S/P left inguinal hernia repair

## 2024-05-06 NOTE — OB RN PATIENT PROFILE - STEPS TO INITIATE SKIN TO SKIN CONTACT DISCUSSED, INCLUDING INITIATING FATHER SKIN TO SKIN IF POSSIBLE.
Saphenous Procedure Note  Pre-Procedure   Staff -        Anesthesiologist:  Ambrosio Downing MD       Performed By: anesthesiologist       Location: pre-op       Procedure Start/Stop Times: 9/17/2021 3:20 PM and 9/17/2021 3:25 PM       Pre-Anesthestic Checklist: patient identified, IV checked, site marked, risks and benefits discussed, informed consent, monitors and equipment checked, pre-op evaluation, at physician/surgeon's request and post-op pain management  Timeout:       Correct Patient: Yes        Correct Procedure: Yes        Correct Site: Yes        Correct Position: Yes        Correct Laterality: Yes        Site Marked: Yes  Procedure Documentation  Procedure: Saphenous       Diagnosis: POSTOP ANALGESIA PER SURGEON REQUEST       Laterality: left       Patient Position: supine       Patient Prep/Sterile Barriers: sterile gloves, mask       Skin prep: Chloraprep       Needle Gauge: 20.        Needle Length (Inches): 4        1. Ultrasound was used to identify targeted nerve, plexus, vascular marker, or fascial plane and place a needle adjacent to it in real-time.       2. Ultrasound was used to visualize the spread of anesthetic in close proximity to the above referenced structure.       3. A permanent image is entered into the patient's record.       4. The visualized anatomic structures appeared normal.       5. There were no apparent abnormal pathologic findings.    Assessment/Narrative         The placement was negative for: blood aspirated, painful injection and site bleeding       Paresthesias: No.     Bolus given via needle..        Secured via.        Insertion/Infusion Method: Single Shot    Medication(s) Administered   Medication Administration Time: 9/17/2021 3:23 PM           Statement Selected

## 2024-05-06 NOTE — OB PROVIDER H&P - ASSESSMENT
A/P: 30yo  @ 39w0d here for rLTCS  - Admit to L&D  - Routine pre-op labs   - EFM  - NPO, Bicitra, Pepcid  - Anesthesia consult for epidural  - Plan for rLTCS    Plan per Dr. Blake Mejia, PAJaseC

## 2024-05-06 NOTE — OB PROVIDER H&P - NSHPPHYSICALEXAM_GEN_ALL_CORE
PE:    T(C): 37 (05-06-24 @ 14:07), Max: 37 (05-06-24 @ 14:07)  HR: 86 (05-06-24 @ 14:07) (86 - 86)  BP: 118/56 (05-06-24 @ 14:07) (118/56 - 118/56)  RR: 12 (05-06-24 @ 14:07) (12 - 12)  SpO2: 98% (05-06-24 @ 14:07) (98% - 98%)    General: NAD, A&Ox3  CV: RRR  Lungs: Clear bilat   Abd: soft, nontender, gravid  VE: deferred  EFM: 140/moderate variablity/+accels/-decels  TOCO: none

## 2024-05-06 NOTE — PRE-ANESTHESIA EVALUATION ADULT - RESPIRATORY RATE (BREATHS/MIN)
tablet Take 1 tablet by mouth daily 8/17/17  Yes Mirella Desai MD   potassium chloride (MICRO-K) 10 MEQ extended release capsule TAKE 1 CAPSULE TWICE DAILY 8/8/17  Yes Mirella Desai MD   citalopram (CELEXA) 10 MG tablet TAKE 1 TABLET EVERY DAY 8/8/17  Yes Mirella Desai MD   gabapentin (NEURONTIN) 300 MG capsule TAKE 2 CAPSULES TWICE DAILY 7/19/17  Yes Mirella Desai MD   NOVOLOG FLEXPEN 100 UNIT/ML injection pen INJECT 4 UNITS AT LUNCH AND  6 UNITS AT SUPPER [facility] PEN EXPIRES 28 DAYS AFTER 1ST USE) 7/19/17  Yes Mirella Desai MD   pantoprazole (PROTONIX) 40 MG tablet TAKE 1 TABLET EVERY DAY 7/19/17  Yes Mirella Desai MD   fluticasone (FLONASE) 50 MCG/ACT nasal spray USE 2 SPRAYS NASALLY EVERY DAY 7/19/17  Yes Mirella Desai MD   furosemide (LASIX) 40 MG tablet TAKE 1 TABLET TWICE DAILY 7/19/17  Yes Mirella Desai MD   buPROPion (WELLBUTRIN) 75 MG tablet TAKE 1 TABLET TWICE DAILY 7/19/17  Yes Mirella Desai MD   ondansetron (ZOFRAN) 4 MG tablet Take 1 tablet by mouth every 8 hours as needed for Nausea or Vomiting 5/16/17  Yes Mirella Desai MD   magnesium oxide (MAG-OX) 400 MG tablet Take 1 tablet by mouth daily 5/16/17  Yes Mirella Desai MD   Ascorbic Acid (VITAMIN C) 500 MG tablet Take 1 tablet by mouth 2 times daily 5/16/17  Yes Mirella Desai MD   Zinc 50 MG TABS Take 50 mg by mouth daily   Yes Historical Provider, MD   loratadine (CLARITIN) 10 MG tablet Take 1 tablet by mouth daily 11/30/16  Yes Mirella Desai MD   niacin 500 MG extended release capsule Take 500 mg by mouth daily   Yes Historical Provider, MD   folic acid (FOLVITE) 397 MCG tablet Take 800 mcg by mouth daily   Yes Historical Provider, MD   Cyanocobalamin (VITAMIN B12 PO) Take 2,500 mcg by mouth daily   Yes Historical Provider, MD   B Complex-C (SUPER B COMPLEX PO) Take by mouth daily   Yes Historical Provider, MD   travoprost, benzalkonium, (TRAVATAN) 0.004 % ophthalmic solution Place 1 drop into both eyes daily 10/7/16  Yes Rickey Gonsales MD   insulin glargine (LANTUS) 100 UNIT/ML injection vial Inject 10 Units into the skin nightly 10/7/16  Yes Rickey Gonsales MD   albuterol (PROVENTIL) (2.5 MG/3ML) 0.083% nebulizer solution Take 2.5 mg by nebulization every 6 hours as needed for Wheezing   Yes Historical Provider, MD   ferrous sulfate 325 (65 FE) MG EC tablet Take 325 mg by mouth daily (with breakfast)   Yes Historical Provider, MD   Multiple Vitamin (MULTI VITAMIN DAILY) TABS Take by mouth daily   Yes Historical Provider, MD   clopidogrel (PLAVIX) 75 MG tablet TAKE 1 TABLET BY MOUTH EVERY DAY 9/63/08   Suad Young MD   Lancet Devices (PRODIGY LANCING DEVICE) MISC USE TO TEST BLOOD SUGAR FOUR TIMES DAILY AS DIRECTED 10/13/17   Rickey Gonsales MD   PRODIGY NO CODING BLOOD GLUC strip USE AS DIRECTED FOUR TIMES DAILY 10/3/17   Rickey Gonsales MD   B-D ULTRAFINE III SHORT PEN 31G X 8 MM MISC USE TWICE DAILY 7/19/17   Rickey Gonsales MD   Compression Stockings MISC by Does not apply route 15-20mmHg  Knee high  Open tow  With assist device to help put them on 5/10/17   Gray Bains, DO   PRODIGY TWIST TOP LANCETS 28G MISC 1 each by Does not apply route 4 times daily DX: E 11.42    Historical Provider, MD   Diabetic Shoe MISC by Does not apply route 11/4/16   Rickey Gonsales MD   Insulin Syringe-Needle U-100 (INSULIN SYRINGE .5CC/31GX5/16\") 31G X 5/16\" 0.5 ML MISC 1 each by Does not apply route daily 10/7/16   Rickey Gonsales MD   CPAP Machine MISC by Does not apply route Pressure of 13 (AirSense 10)  [employer].    Historical Provider, MD   nitroGLYCERIN (NITROSTAT) 0.4 MG SL tablet Place 0.4 mg under the tongue every 5 minutes as needed for Chest pain Dissolve 1 tab under tongue at first sign of chest pain. May repeat every 5 minutes until relief is obtained.  If pain persists after taking 3 tabs in a 15-minute period, or the pain is different than is typically experienced, call 9-1-1 immediately. Historical Provider, MD       Current medications:    Current Facility-Administered Medications   Medication Dose Route Frequency Provider Last Rate Last Dose    sodium chloride flush 0.9 % injection 10 mL  10 mL Intravenous 2 times per day Jarret Bob MD        sodium chloride flush 0.9 % injection 10 mL  10 mL Intravenous PRN Jarret Bob MD        ceFAZolin (ANCEF) 2 g in sterile water 20 mL IV syringe  2 g Intravenous On Call to [address] Gonzalez MD        lactated ringers infusion   Intravenous Continuous Jarret Bob  mL/hr at 02/07/18 0909         Allergies:  No Known Allergies    Problem List:    Patient Active Problem List   Diagnosis Code    Obstructive sleep apnea G47.33    Chronic fatigue R53.82    Coronary artery disease due to lipid rich plaque I25.10, I25.83    Vitamin D deficiency E55.9    Type 2 diabetes mellitus with diabetic polyneuropathy, with long-term current use of insulin ([facility]) E11.42, Z79.4    Peripheral polyneuropathy ([facility] G62.9    Bilateral leg edema R60.0    Right hip pain M25.551    Muscle ache M79.1    Hx of Bell's palsy Z86.69    Memory problem R41.3    Gait abnormality R26.9    Balance problem R26.89    H/O falling Z91.81    Dizziness R42    Intractable episodic tension-type headache G44. 80    Essential tremor G25.0    Anxiety and depression F41.8    Elevated hemoglobin A1c R73.09    VBI (vertebrobasilar insufficiency) G45.0    Chronic cerebral ischemia I67.82    TIA (transient ischemic attack) G45.9    Chronic tension headache G44.229       Past Medical History:        Diagnosis Date    CAD (coronary artery disease) 46    Cancer ([address] 1971    cervical    Coronary artery disease     Hx of Bell's palsy     Mild right facial paralysis    Mild dementia     Neuropathy ([facility]     Sleep apnea 11/07/2014    [facility]    Type 2 diabetes mellitus with hyperglycemia [facility])        Past Surgical History:        Procedure results found for: PROTIME, INR, APTT    HCG (If Applicable): No results found for: PREGTESTUR, PREGSERUM, HCG, HCGQUANT     ABGs: No results found for: PHART, PO2ART, KOV4SEI, TSV5ZUB, BEART, A7KTROIU     Type & Screen (If Applicable):  No results found for: LABABO, [address]    Anesthesia Evaluation  Patient summary reviewed  Airway: Mallampati: I  TM distance: >3 FB   Neck ROM: full  Mouth opening: > = 3 FB Dental:          Pulmonary:normal exam    (+) sleep apnea: on CPAP,                             Cardiovascular:  Exercise tolerance: good (>4 METS),                     Neuro/Psych:               GI/Hepatic/Renal:             Endo/Other:    (+) Type II DM, no interval change, , .                 Abdominal:   (+) obese,         Vascular:                                        Anesthesia Plan      MAC     ASA 3       Induction: intravenous. Anesthetic plan and risks discussed with patient.       Plan discussed with surgical team.                  Tory Howard CRNA   2/7/2018 12

## 2024-05-06 NOTE — OB RN INTRAOPERATIVE NOTE - NSSELHIDDEN_OBGYN_ALL_OB_FT
[NS_DeliveryAttending1_OBGYN_ALL_OB_FT:[email or web address]],[NS_DeliveryAssist1_OBGYN_ALL_OB_FT:[id number]=],[NS_DeliveryRN_OBGYN_ALL_OB_FT:[id number]]

## 2024-05-06 NOTE — OB PROVIDER H&P - HISTORY OF PRESENT ILLNESS
This is a 29 year old female with past medical history of Hypothyroidism follows with endo every 6 months. Currently 38 weeks gestation, , presenting to PST for Repeat  on 24 with Dr. Lozano    Pt is a 28yo  @ 39w0d here for rLTCS. Pt. denies LOF, VB, CTX. +FM. PNC uncomplicated GBS (-) EFW 3700g    OBHx: 2018: c/s / cat 2, 8#1; : rLTCS, FT. 8#1  GYNHx: denies ovarian cysts, fibroids, hx of abnormal pap or STDs  PMHx: Hypothyroid  PSurgHx: C/S x 2; : L inguinal hernia  Allergies: Sulfa drugs: rash; PCN: rash  Meds: PNV, Synthroid 125mcg  Social: No smoking, alcohol, or illicit drug use  Psych: No hx of anxiety or depression

## 2024-05-06 NOTE — OB RN DELIVERY SUMMARY - NSSELHIDDEN_OBGYN_ALL_OB_FT
[NS_DeliveryAttending1_OBGYN_ALL_OB_FT:[email or web address]=],[NS_DeliveryAssist1_OBGYN_ALL_OB_FT:[id number]=],[NS_DeliveryRN_OBGYN_ALL_OB_FT:[id number]==]

## 2024-05-07 LAB
BASOPHILS # BLD AUTO: 0.02 K/UL — SIGNIFICANT CHANGE UP (ref 0–0.2)
BASOPHILS NFR BLD AUTO: 0.2 % — SIGNIFICANT CHANGE UP (ref 0–2)
EOSINOPHIL # BLD AUTO: 0.02 K/UL — SIGNIFICANT CHANGE UP (ref 0–0.5)
EOSINOPHIL NFR BLD AUTO: 0.2 % — SIGNIFICANT CHANGE UP (ref 0–6)
HCT VFR BLD CALC: 29.6 % — LOW (ref 34.5–45)
HGB BLD-MCNC: 10.1 G/DL — LOW (ref 11.5–15.5)
IMM GRANULOCYTES NFR BLD AUTO: 0.8 % — SIGNIFICANT CHANGE UP (ref 0–0.9)
LYMPHOCYTES # BLD AUTO: 1.52 K/UL — SIGNIFICANT CHANGE UP (ref 1–3.3)
LYMPHOCYTES # BLD AUTO: 18.1 % — SIGNIFICANT CHANGE UP (ref 13–44)
MCHC RBC-ENTMCNC: 30 PG — SIGNIFICANT CHANGE UP (ref 27–34)
MCHC RBC-ENTMCNC: 34.1 GM/DL — SIGNIFICANT CHANGE UP (ref 32–36)
MCV RBC AUTO: 87.8 FL — SIGNIFICANT CHANGE UP (ref 80–100)
MONOCYTES # BLD AUTO: 0.57 K/UL — SIGNIFICANT CHANGE UP (ref 0–0.9)
MONOCYTES NFR BLD AUTO: 6.8 % — SIGNIFICANT CHANGE UP (ref 2–14)
NEUTROPHILS # BLD AUTO: 6.21 K/UL — SIGNIFICANT CHANGE UP (ref 1.8–7.4)
NEUTROPHILS NFR BLD AUTO: 73.9 % — SIGNIFICANT CHANGE UP (ref 43–77)
NRBC # BLD: 0 /100 WBCS — SIGNIFICANT CHANGE UP (ref 0–0)
PLATELET # BLD AUTO: 111 K/UL — LOW (ref 150–400)
RBC # BLD: 3.37 M/UL — LOW (ref 3.8–5.2)
RBC # FLD: 14.3 % — SIGNIFICANT CHANGE UP (ref 10.3–14.5)
T PALLIDUM AB TITR SER: NEGATIVE — SIGNIFICANT CHANGE UP
WBC # BLD: 8.41 K/UL — SIGNIFICANT CHANGE UP (ref 3.8–10.5)
WBC # FLD AUTO: 8.41 K/UL — SIGNIFICANT CHANGE UP (ref 3.8–10.5)

## 2024-05-07 RX ORDER — IBUPROFEN 200 MG
600 TABLET ORAL EVERY 6 HOURS
Refills: 0 | Status: DISCONTINUED | OUTPATIENT
Start: 2024-05-07 | End: 2024-05-09

## 2024-05-07 RX ORDER — LEVOTHYROXINE SODIUM 125 MCG
125 TABLET ORAL DAILY
Refills: 0 | Status: DISCONTINUED | OUTPATIENT
Start: 2024-05-07 | End: 2024-05-09

## 2024-05-07 RX ADMIN — Medication 600 MILLIGRAM(S): at 18:05

## 2024-05-07 RX ADMIN — Medication 975 MILLIGRAM(S): at 09:37

## 2024-05-07 RX ADMIN — Medication 100 MILLIGRAM(S): at 21:48

## 2024-05-07 RX ADMIN — HEPARIN SODIUM 5000 UNIT(S): 5000 INJECTION INTRAVENOUS; SUBCUTANEOUS at 12:02

## 2024-05-07 RX ADMIN — Medication 975 MILLIGRAM(S): at 03:30

## 2024-05-07 RX ADMIN — Medication 30 MILLIGRAM(S): at 05:46

## 2024-05-07 RX ADMIN — Medication 975 MILLIGRAM(S): at 15:20

## 2024-05-07 RX ADMIN — Medication 975 MILLIGRAM(S): at 16:16

## 2024-05-07 RX ADMIN — Medication 100 MILLIGRAM(S): at 06:14

## 2024-05-07 RX ADMIN — Medication 100 MILLIGRAM(S): at 14:35

## 2024-05-07 RX ADMIN — Medication 975 MILLIGRAM(S): at 22:00

## 2024-05-07 RX ADMIN — Medication 125 MICROGRAM(S): at 06:13

## 2024-05-07 RX ADMIN — Medication 30 MILLIGRAM(S): at 13:23

## 2024-05-07 RX ADMIN — Medication 975 MILLIGRAM(S): at 21:14

## 2024-05-07 RX ADMIN — Medication 600 MILLIGRAM(S): at 17:19

## 2024-05-07 RX ADMIN — Medication 30 MILLIGRAM(S): at 12:02

## 2024-05-07 RX ADMIN — Medication 975 MILLIGRAM(S): at 09:05

## 2024-05-07 RX ADMIN — Medication 975 MILLIGRAM(S): at 02:51

## 2024-05-08 RX ORDER — OXYCODONE HYDROCHLORIDE 5 MG/1
5 TABLET ORAL
Refills: 0 | Status: DISCONTINUED | OUTPATIENT
Start: 2024-05-08 | End: 2024-05-09

## 2024-05-08 RX ADMIN — Medication 600 MILLIGRAM(S): at 00:09

## 2024-05-08 RX ADMIN — Medication 975 MILLIGRAM(S): at 15:56

## 2024-05-08 RX ADMIN — OXYCODONE HYDROCHLORIDE 5 MILLIGRAM(S): 5 TABLET ORAL at 00:09

## 2024-05-08 RX ADMIN — Medication 600 MILLIGRAM(S): at 12:45

## 2024-05-08 RX ADMIN — Medication 975 MILLIGRAM(S): at 09:02

## 2024-05-08 RX ADMIN — Medication 975 MILLIGRAM(S): at 04:00

## 2024-05-08 RX ADMIN — Medication 975 MILLIGRAM(S): at 03:13

## 2024-05-08 RX ADMIN — OXYCODONE HYDROCHLORIDE 5 MILLIGRAM(S): 5 TABLET ORAL at 04:00

## 2024-05-08 RX ADMIN — Medication 975 MILLIGRAM(S): at 21:48

## 2024-05-08 RX ADMIN — Medication 125 MICROGRAM(S): at 05:27

## 2024-05-08 RX ADMIN — Medication 600 MILLIGRAM(S): at 05:27

## 2024-05-08 RX ADMIN — Medication 975 MILLIGRAM(S): at 15:10

## 2024-05-08 RX ADMIN — Medication 975 MILLIGRAM(S): at 20:48

## 2024-05-08 RX ADMIN — Medication 600 MILLIGRAM(S): at 23:42

## 2024-05-08 RX ADMIN — Medication 975 MILLIGRAM(S): at 09:30

## 2024-05-08 RX ADMIN — Medication 600 MILLIGRAM(S): at 12:06

## 2024-05-08 RX ADMIN — Medication 100 MILLIGRAM(S): at 14:05

## 2024-05-08 RX ADMIN — Medication 100 MILLIGRAM(S): at 21:39

## 2024-05-08 RX ADMIN — Medication 600 MILLIGRAM(S): at 01:00

## 2024-05-08 RX ADMIN — Medication 600 MILLIGRAM(S): at 17:55

## 2024-05-08 RX ADMIN — Medication 100 MILLIGRAM(S): at 05:30

## 2024-05-09 ENCOUNTER — TRANSCRIPTION ENCOUNTER (OUTPATIENT)
Age: 29
End: 2024-05-09

## 2024-05-09 VITALS
TEMPERATURE: 98 F | SYSTOLIC BLOOD PRESSURE: 106 MMHG | OXYGEN SATURATION: 96 % | DIASTOLIC BLOOD PRESSURE: 66 MMHG | HEART RATE: 81 BPM | RESPIRATION RATE: 18 BRPM

## 2024-05-09 PROCEDURE — 86900 BLOOD TYPING SEROLOGIC ABO: CPT

## 2024-05-09 PROCEDURE — 85025 COMPLETE CBC W/AUTO DIFF WBC: CPT

## 2024-05-09 PROCEDURE — 86901 BLOOD TYPING SEROLOGIC RH(D): CPT

## 2024-05-09 PROCEDURE — 59025 FETAL NON-STRESS TEST: CPT

## 2024-05-09 PROCEDURE — 59050 FETAL MONITOR W/REPORT: CPT

## 2024-05-09 PROCEDURE — 86780 TREPONEMA PALLIDUM: CPT

## 2024-05-09 PROCEDURE — 86850 RBC ANTIBODY SCREEN: CPT

## 2024-05-09 RX ORDER — IBUPROFEN 200 MG
1 TABLET ORAL
Qty: 0 | Refills: 0 | DISCHARGE
Start: 2024-05-09

## 2024-05-09 RX ORDER — LEVOTHYROXINE SODIUM 125 MCG
1 TABLET ORAL
Refills: 0 | DISCHARGE

## 2024-05-09 RX ORDER — ACETAMINOPHEN 500 MG
3 TABLET ORAL
Qty: 0 | Refills: 0 | DISCHARGE
Start: 2024-05-09

## 2024-05-09 RX ADMIN — Medication 600 MILLIGRAM(S): at 05:43

## 2024-05-09 RX ADMIN — Medication 125 MICROGRAM(S): at 05:44

## 2024-05-09 RX ADMIN — Medication 600 MILLIGRAM(S): at 06:43

## 2024-05-09 RX ADMIN — Medication 975 MILLIGRAM(S): at 03:02

## 2024-05-09 RX ADMIN — Medication 975 MILLIGRAM(S): at 10:25

## 2024-05-09 RX ADMIN — Medication 600 MILLIGRAM(S): at 00:42

## 2024-05-09 RX ADMIN — Medication 975 MILLIGRAM(S): at 04:02

## 2024-05-09 RX ADMIN — Medication 100 MILLIGRAM(S): at 05:45

## 2024-05-09 RX ADMIN — Medication 975 MILLIGRAM(S): at 09:40

## 2024-05-09 NOTE — DISCHARGE NOTE OB - PATIENT PORTAL LINK FT
Addendum  created 03/18/22 0649 by Danette Da Silva CRNA    Flowsheet accepted You can access the FollowMyHealth Patient Portal offered by [facility] by registering at the following website: [email or web address]. By joining [facility]’s FollowMyHealth portal, you will also be able to view your health information using other applications (apps) compatible with our system.

## 2024-05-09 NOTE — DISCHARGE NOTE OB - CARE PLAN
1 Principal Discharge DX:	Single delivery by  section  Assessment and plan of treatment:	return to baseline health, regular diet, pain control, Follow up with

## 2024-05-09 NOTE — DISCHARGE NOTE OB - NS MD DC FALL RISK RISK
For information on Fall & Injury Prevention, visit: [email or web address] OR  [email or web address] OR  https://www.cdc.gov/steadi/patient.html

## 2024-05-09 NOTE — PROGRESS NOTE ADULT - ASSESSMENT
[facility]  and is meeting all postpartum milestones. Vital signs are stable and physical exam is unremarkable.  - Increase ambulation  - Continue regular diet  - Renew IV fluids  - H/H: 11.2/34.8->10.1/29.6    STACIE Verduzco, PGY-1
[facility]  and is meeting all postpartum milestones. Vital signs are stable and physical exam is unremarkable.  - Increase ambulation  - Continue regular diet  - Renew IV fluids  - Camacho is removed  - H/H: 11.2/34.8->10.1/29.6    STACIE Verduzco, PGY-1

## 2024-05-09 NOTE — PROGRESS NOTE ADULT - SUBJECTIVE AND OBJECTIVE BOX
Patient seen and examined at bedside, no acute overnight events. No acute complaints, pain well controlled. Patient is ambulating, voiding spontaneously and tolerating regular diet. Awaiting flatus. Denies CP, SOB, N/V, HA, blurred vision, epigastric pain.    Vital Signs Last 24 Hours  T(C): 36.8 (05-07-24 @ 05:27), Max: 37 (05-06-24 @ 14:07)  HR: 57 (05-07-24 @ 05:27) (57 - 97)  BP: 93/58 (05-07-24 @ 05:27) (90/45 - 118/56)  RR: 18 (05-07-24 @ 05:27) (12 - 20)  SpO2: 95% (05-07-24 @ 05:27) (95% - 99%)    Physical Exam:  General: NAD  Abdomen: Soft, non-tender, non-distended, fundus firm  Incision: Pfannenstiel incision CDI, subcuticular suture closure  Pelvic: Lochia wnl    Labs:    Blood Type: A Positive  Antibody Screen: Negative  RPR: Negative               10.1   8.41  )-----------( 111      ( 05-07 @ 06:35 )             29.6                11.2   6.06  )-----------( 125      ( 05-01 @ 09:20 )             34.8         MEDICATIONS  (STANDING):  acetaminophen     Tablet .. 975 milliGRAM(s) Oral <User Schedule>  ceFAZolin   IVPB 2000 milliGRAM(s) IV Intermittent every 8 hours  clindamycin IVPB 900 milliGRAM(s) IV Intermittent once  diphtheria/tetanus/pertussis (acellular) Vaccine (Adacel) 0.5 milliLiter(s) IntraMuscular once  gentamicin   IVPB 320 milliGRAM(s) IV Intermittent once  heparin   Injectable 5000 Unit(s) SubCutaneous every 12 hours  ibuprofen  Tablet. 600 milliGRAM(s) Oral every 6 hours  ketorolac   Injectable 30 milliGRAM(s) IV Push every 6 hours  lactated ringers. 1000 milliLiter(s) (125 mL/Hr) IV Continuous <Continuous>  lactated ringers. 1000 milliLiter(s) (200 mL/Hr) IV Continuous <Continuous>  levothyroxine 125 MICROGram(s) Oral daily  morphine PF Spinal 0.1 milliGRAM(s) IntraThecal. once  oxytocin Infusion 333.333 milliUNIT(s)/Min (1000 mL/Hr) IV Continuous <Continuous>    MEDICATIONS  (PRN):  diphenhydrAMINE 25 milliGRAM(s) Oral every 6 hours PRN Pruritus  lanolin Ointment 1 Application(s) Topical every 6 hours PRN Sore Nipples  magnesium hydroxide Suspension 30 milliLiter(s) Oral two times a day PRN Constipation  oxyCODONE    IR 5 milliGRAM(s) Oral every 3 hours PRN Moderate to Severe Pain (4-10)  oxyCODONE    IR 5 milliGRAM(s) Oral once PRN Moderate to Severe Pain (4-10)  simethicone 80 milliGRAM(s) Chew every 4 hours PRN Gas  
Day 1 of Anesthesia Pain Management Service    SUBJECTIVE:  Pain Scale Score:          [X] Refer to charted pain scores    THERAPY: Received PF spinal morphine as above    OBJECTIVE:    Sedation:        	[X] Alert	[ ] Drowsy	[ ] Arousable      [ ] Asleep       [ ] Unresponsive    Side Effects:	[X] None	[ ] Nausea	[ ] Vomiting         [ ] Pruritus  		[ ] Weakness            [ ] Numbness	          [ ] Other:    ASSESSMENT/ PLAN  [X] Patient transitioned to prn analgesics  [X] Pain management per primary service, pain service to sign off   [X]Documentation and Verification of current medications
Day 1 of Anesthesia Pain Management Service    SUBJECTIVE: Doing ok  Pain Scale Score:          [X] Refer to charted pain scores    THERAPY:    s/p    100 mcg PF morphine on 5\6\2024      MEDICATIONS  (STANDING):  acetaminophen     Tablet .. 975 milliGRAM(s) Oral <User Schedule>  ceFAZolin   IVPB 2000 milliGRAM(s) IV Intermittent every 8 hours  clindamycin IVPB 900 milliGRAM(s) IV Intermittent once  diphtheria/tetanus/pertussis (acellular) Vaccine (Adacel) 0.5 milliLiter(s) IntraMuscular once  gentamicin   IVPB 320 milliGRAM(s) IV Intermittent once  heparin   Injectable 5000 Unit(s) SubCutaneous every 12 hours  ibuprofen  Tablet. 600 milliGRAM(s) Oral every 6 hours  ketorolac   Injectable 30 milliGRAM(s) IV Push every 6 hours  lactated ringers. 1000 milliLiter(s) (125 mL/Hr) IV Continuous <Continuous>  lactated ringers. 1000 milliLiter(s) (200 mL/Hr) IV Continuous <Continuous>  levothyroxine 125 MICROGram(s) Oral daily  morphine PF Spinal 0.1 milliGRAM(s) IntraThecal. once  oxytocin Infusion 333.333 milliUNIT(s)/Min (1000 mL/Hr) IV Continuous <Continuous>    MEDICATIONS  (PRN):  diphenhydrAMINE 25 milliGRAM(s) Oral every 6 hours PRN Pruritus  lanolin Ointment 1 Application(s) Topical every 6 hours PRN Sore Nipples  magnesium hydroxide Suspension 30 milliLiter(s) Oral two times a day PRN Constipation  oxyCODONE    IR 5 milliGRAM(s) Oral every 3 hours PRN Moderate to Severe Pain (4-10)  oxyCODONE    IR 5 milliGRAM(s) Oral once PRN Moderate to Severe Pain (4-10)  simethicone 80 milliGRAM(s) Chew every 4 hours PRN Gas      OBJECTIVE:    Sedation:        	[X] Alert	 [ ] Drowsy	[ ] Arousable      [ ] Asleep       [ ] Unresponsive    Side Effects:	[X] None 	[ ] Nausea	[ ] Vomiting         [ ] Pruritus  		[ ] Weakness            [ ] Numbness	          [ ] Other:    Vital Signs Last 24 Hrs  T(C): 36.8 (07 May 2024 05:27), Max: 37 (06 May 2024 14:07)  T(F): 98.3 (07 May 2024 05:27), Max: 98.4 (07 May 2024 00:56)  HR: 57 (07 May 2024 05:27) (57 - 97)  BP: 93/58 (07 May 2024 05:27) (90/45 - 118/56)  BP(mean): 72 (06 May 2024 19:40) (65 - 74)  RR: 18 (07 May 2024 05:27) (12 - 20)  SpO2: 95% (07 May 2024 05:27) (95% - 99%)    Parameters below as of 07 May 2024 05:27  Patient On (Oxygen Delivery Method): room air        ASSESSMENT/ PLAN  [X] Patient to be transitioned to prn analgesics after 24 hours  [X] Pain management per primary service, pain service to sign off   [X]Documentation and Verification of current medications
Patient seen and examined at bedside, no acute overnight events. No acute complaints, pain well controlled. Patient is ambulating, voiding spontaneously, passing flatus, and tolerating regular diet. Denies CP, SOB, N/V, HA, blurred vision, epigastric pain.    Vital Signs Last 24 Hours  T(C): 36.6 (05-09-24 @ 05:31), Max: 36.9 (05-08-24 @ 21:10)  HR: 76 (05-09-24 @ 05:31) (75 - 79)  BP: 103/67 (05-09-24 @ 05:31) (102/66 - 106/69)  RR: 18 (05-09-24 @ 05:31) (18 - 18)  SpO2: 97% (05-09-24 @ 05:31) (96% - 97%)    Physical Exam:  General: NAD  Abdomen: Soft, non-tender, non-distended, fundus firm  Incision: Pfannenstiel incision CDI, subcuticular suture closure  Pelvic: Lochia wnl    Labs:    Blood Type: A Positive  Antibody Screen: Negative  RPR: Negative               10.1   8.41  )-----------( 111      ( 05-07 @ 06:35 )             29.6                11.2   6.06  )-----------( 125      ( 05-01 @ 09:20 )             34.8         MEDICATIONS  (STANDING):  acetaminophen     Tablet .. 975 milliGRAM(s) Oral <User Schedule>  ceFAZolin   IVPB 2000 milliGRAM(s) IV Intermittent every 8 hours  clindamycin IVPB 900 milliGRAM(s) IV Intermittent once  diphtheria/tetanus/pertussis (acellular) Vaccine (Adacel) 0.5 milliLiter(s) IntraMuscular once  gentamicin   IVPB 320 milliGRAM(s) IV Intermittent once  heparin   Injectable 5000 Unit(s) SubCutaneous every 12 hours  ibuprofen  Tablet. 600 milliGRAM(s) Oral every 6 hours  lactated ringers. 1000 milliLiter(s) (200 mL/Hr) IV Continuous <Continuous>  lactated ringers. 1000 milliLiter(s) (125 mL/Hr) IV Continuous <Continuous>  levothyroxine 125 MICROGram(s) Oral daily  oxytocin Infusion 333.333 milliUNIT(s)/Min (1000 mL/Hr) IV Continuous <Continuous>    MEDICATIONS  (PRN):  diphenhydrAMINE 25 milliGRAM(s) Oral every 6 hours PRN Pruritus  lanolin Ointment 1 Application(s) Topical every 6 hours PRN Sore Nipples  magnesium hydroxide Suspension 30 milliLiter(s) Oral two times a day PRN Constipation  oxyCODONE    IR 5 milliGRAM(s) Oral every 3 hours PRN Moderate to Severe Pain (4-10)  oxyCODONE    IR 5 milliGRAM(s) Oral once PRN Moderate to Severe Pain (4-10)  simethicone 80 milliGRAM(s) Chew every 4 hours PRN Gas

## 2024-05-09 NOTE — DISCHARGE NOTE OB - CARE PROVIDER_API CALL
Lee Ann Lozano  Obstetrics and Gynecology  [address] [facility] [address] [address]  [place] [address]  Phone: [phone]  Fax: [phone]  Follow Up Time:

## 2024-07-23 NOTE — DISCHARGE NOTE OB - RECOGNIZE DANGER SITUATIONS. FOR EXAMPLE, STRESS, DRINKING ALCOHOL, URGES TO SMOKE, SMOKING CUES, CIGARETTE AVAILABILITY
What Type Of Note Output Would You Prefer (Optional)?: Bullet Format How Severe Are Your Spot(S)?: mild Have Your Spot(S) Been Treated In The Past?: has not been treated Hpi Title: Evaluation of Skin Lesions Additional History: CSE. Statement Selected

## 2024-09-13 ENCOUNTER — OUTPATIENT (OUTPATIENT)
Dept: OUTPATIENT SERVICES | Facility: HOSPITAL | Age: 29
LOS: 1 days | End: 2024-09-13

## 2024-09-13 VITALS
DIASTOLIC BLOOD PRESSURE: 66 MMHG | HEART RATE: 78 BPM | HEIGHT: 66 IN | OXYGEN SATURATION: 98 % | RESPIRATION RATE: 16 BRPM | TEMPERATURE: 98 F | WEIGHT: 134.92 LBS | SYSTOLIC BLOOD PRESSURE: 98 MMHG

## 2024-09-13 DIAGNOSIS — E03.9 HYPOTHYROIDISM, UNSPECIFIED: ICD-10-CM

## 2024-09-13 DIAGNOSIS — Z98.890 OTHER SPECIFIED POSTPROCEDURAL STATES: Chronic | ICD-10-CM

## 2024-09-13 DIAGNOSIS — Z98.891 HISTORY OF UTERINE SCAR FROM PREVIOUS SURGERY: Chronic | ICD-10-CM

## 2024-09-13 DIAGNOSIS — H02.422 MYOGENIC PTOSIS OF LEFT EYELID: ICD-10-CM

## 2024-09-13 LAB — HCG UR QL: NEGATIVE — SIGNIFICANT CHANGE UP

## 2024-09-13 NOTE — H&P PST ADULT - NSANTHGENDERRD_ENT_A_CORE
Impression: Diabetes mellitus Type 2 with proliferative retinopathy with macular edema, bilateral Plan: An intravitreal Eylea  was administered today without complication, OU. The patient will return to the clinic in 6-8 weeks for a Eylea injection OU . No

## 2024-09-13 NOTE — H&P PST ADULT - NSICDXPASTSURGICALHX_GEN_ALL_CORE_FT
PAST SURGICAL HISTORY:  History of 3  sections     History of rhinoplasty     S/P left inguinal hernia repair

## 2024-09-13 NOTE — H&P PST ADULT - PROBLEM SELECTOR PLAN 3
DELIRIUM SCREENING   1-Patient eligible for wu risk screen age>75? no  2-Health care proxy paperwork given to patient? Yes, Health care proxy form provided and explained  3-Impaired mobility (ie: uses cane, walker, wheelchair, or assist device)?   4-Known dementia diagnosis?   5-Impaired functional status (METS<4)?   6-Malnutrition BMI<20?

## 2024-09-13 NOTE — H&P PST ADULT - HISTORY OF PRESENT ILLNESS
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

## 2024-09-13 NOTE — H&P PST ADULT - PROBLEM SELECTOR PLAN 1
Patient scheduled for surgery on: 9/23/24  Provided with verbal and written presurgical instructions

## 2024-09-23 ENCOUNTER — TRANSCRIPTION ENCOUNTER (OUTPATIENT)
Age: 29
End: 2024-09-23

## 2024-09-23 ENCOUNTER — OUTPATIENT (OUTPATIENT)
Dept: OUTPATIENT SERVICES | Facility: HOSPITAL | Age: 29
LOS: 1 days | Discharge: HOME | End: 2024-09-23

## 2024-09-23 VITALS
HEIGHT: 66 IN | TEMPERATURE: 98 F | SYSTOLIC BLOOD PRESSURE: 117 MMHG | DIASTOLIC BLOOD PRESSURE: 70 MMHG | HEART RATE: 65 BPM | RESPIRATION RATE: 18 BRPM | OXYGEN SATURATION: 100 % | WEIGHT: 134.92 LBS

## 2024-09-23 VITALS
SYSTOLIC BLOOD PRESSURE: 104 MMHG | RESPIRATION RATE: 17 BRPM | DIASTOLIC BLOOD PRESSURE: 64 MMHG | TEMPERATURE: 98 F | HEART RATE: 66 BPM | OXYGEN SATURATION: 100 %

## 2024-09-23 DIAGNOSIS — Z98.890 OTHER SPECIFIED POSTPROCEDURAL STATES: Chronic | ICD-10-CM

## 2024-09-23 DIAGNOSIS — H02.422 MYOGENIC PTOSIS OF LEFT EYELID: ICD-10-CM

## 2024-09-23 DIAGNOSIS — Z98.891 HISTORY OF UTERINE SCAR FROM PREVIOUS SURGERY: Chronic | ICD-10-CM

## 2024-09-23 RX ORDER — NORETHINDRONE 0.35 MG/1
1 TABLET ORAL
Refills: 0 | DISCHARGE

## 2024-09-23 RX ORDER — LEVOTHYROXINE SODIUM 100 MCG
1 TABLET ORAL
Refills: 0 | DISCHARGE

## 2024-09-23 NOTE — ASU DISCHARGE PLAN (ADULT/PEDIATRIC) - CARE PROVIDER_API CALL
MEI Martínez Coden  Ophthalmology  [address] [place] [address]  Phone: [phone]  Fax: [phone]  Follow Up Time:

## 2024-09-23 NOTE — ASU DISCHARGE PLAN (ADULT/PEDIATRIC) - NS MD DC FALL RISK RISK
For information on Fall & Injury Prevention, visit: [email or web address] OR  [email or web address] OR  https://www.cdc.gov/steadi/patient.html

## 2024-09-23 NOTE — ASU PREOP CHECKLIST - AS BP NONINV SITE
Cryotherapy Text: The wound bed was treated with cryotherapy after the biopsy was performed. left upper arm

## 2024-12-10 NOTE — PRE-ANESTHESIA EVALUATION ADULT - NSANTHBPHIGHRD_ENT_A_CORE
----- Message from Rayna PERERA RN sent at 12/10/2024  2:02 PM CST -----  Patient was prescribed Clonidine per patient.  Patient picked up his prescription and began taking it this morning.Patient reports and additionally noted in computer that dose is 0.1mg daily.  Arjun did  from [employer] and medication bottle noted to say 1-2 tablets every 4 hours as needed for anxiety.Patient will be taking as your office suggested 0.1mg daily unless your office confirms otherwise with him.Patient blood pressure noted today to be 164/98.Patient being discharged from [facility] services today as patient is independent with medication set up.Please contact me with further questions or concerns.Thank you,Rayna [facility]   No

## 2024-12-14 NOTE — ASU PREOPERATIVE ASSESSMENT, ADULT ([facility] ONLY) - LOCATION
Patient : Shayla Crow Age: 34 year old Sex: female   MRN: [id number] Encounter Date: 12/14/2024      History     Chief Complaint   Patient presents with    Abnormal Lab     This is a 34-year-old female with past medical history significant for fibroids who presents with abnormal CBC.  Patient was advised by her doctor to come to the emergency department due to a low hemoglobin.  Patient was told her hemoglobin was 7.3.  Patient denies any symptoms at this time.  Patient states she is currently on her menstrual cycle.  Patient denies fever, chills, shortness of breath, dizziness, headache.        No Known Allergies    Discharge Medication List as of 12/14/2024  2:02 PM        Prior to Admission Medications    Details   fluconazole (DIFLUCAN) 150 MG tablet Take 1 tablet by mouth every 3 days.Eprescribe, Disp-2 tablet, R-1      valACYclovir (Valtrex) 500 MG tablet Take 1 tablet by mouth daily.Eprescribe, Oral, DAILY, Disp-90 tablet, R-3Dose should be adjusted based on patient's renal function. See recommendations below:     Treatment dose   CrCl  >/= 50 mL/min: 1000 mg Q8H   CrCl 30-49 mL/min: 1000 mg Q12H   CrC l 10-29 mL/min: 1000 mg Q24H   CrCl  < 10 mL/min: 500 mg Q24H   Hemodialysis: 500 mg Q24H (give dose after dialysis on HD days)     Prophylaxis dose   Usual dose: 500 mg Q12H x 4 weeks   Peritoneal Dialysis: 500 mg Q24-48H      nystatin-triamcinolone (MYCOLOG II) 633406-4.1 UNIT/GM-% ointment Apply topically 2 times daily.Disp-30 g, R-1, Eprescribe             Past Medical History:   Diagnosis Date    Encounter for laboratory test 08/03/2023    Fibroids 07/24/2023    No known problems        Past Surgical History:   Procedure Laterality Date    ANES HYSTEROSCOPY,REMOVE FB  08/21/2023    HYSTEROSCOPY DILATION AND CURETTAGE WITH TRANSCERVICAL RADIOFREQUENCY ABLATION OF FIBROIDS SONATA    D AND C         Family History   Problem Relation Age of Onset    Cancer Maternal Uncle        Social History     Tobacco  Use    Smoking status: Never    Smokeless tobacco: Never   Vaping Use    Vaping status: never used   Substance Use Topics    Alcohol use: Yes     Comment: last drink yesterday    Drug use: No       Review of Systems   Constitutional: Negative.    HENT: Negative.     Respiratory: Negative.     Cardiovascular: Negative.    Gastrointestinal: Negative.    Genitourinary: Negative.    Musculoskeletal: Negative.    Neurological: Negative.    Psychiatric/Behavioral: Negative.         Physical Exam     ED Triage Vitals [12/14/24 1034]   ED Triage Vitals Group      Temp 98.7 °F (37.1 °C)      Heart Rate 91      Resp 16      BP (!) 141/88      SpO2 100 %      EtCO2 mmHg       Height       Weight       Weight Scale Used       BMI (Calculated)       IBW/kg (Calculated)        Physical Exam  Constitutional:       General: She is not in acute distress.     Appearance: Normal appearance. She is not ill-appearing or toxic-appearing.   HENT:      Head: Normocephalic and atraumatic.      Nose: Nose normal.      Neck: Normal range of motion.   Eyes:      Conjunctiva/sclera: Conjunctivae normal.   Cardiovascular:      Rate and Rhythm: Normal rate and regular rhythm.      Pulses: Normal pulses.      Heart sounds: Normal heart sounds.   Pulmonary:      Effort: Pulmonary effort is normal.      Breath sounds: Normal breath sounds.   Abdominal:      General: Abdomen is flat. Bowel sounds are normal.      Palpations: Abdomen is soft.   Skin:     General: Skin is warm and dry.   Neurological:      General: No focal deficit present.      Mental Status: She is alert and oriented to person, place, and time. Mental status is at baseline.   Psychiatric:         Mood and Affect: Mood normal.         Behavior: Behavior normal.         Thought Content: Thought content normal.         Judgment: Judgment normal.         ED Course     Procedures    Lab Results     Results for orders placed or performed during the hospital encounter of 12/14/24    Comprehensive Metabolic Panel    Specimen: Blood, Venous   Result Value Ref Range    Fasting Status      Sodium 138 135 - 145 mmol/L    Potassium 3.2 (L) 3.4 - 5.1 mmol/L    Chloride 106 97 - 110 mmol/L    Carbon Dioxide 24 21 - 32 mmol/L    Anion Gap 11 7 - 19 mmol/L    Glucose 90 70 - 99 mg/dL    BUN 9 6 - 20 mg/dL    Creatinine 0.95 0.51 - 0.95 mg/dL    Glomerular Filtration Rate 81 >=60    BUN/Cr 9 7 - 25    Calcium 8.4 8.4 - 10.2 mg/dL    Bilirubin, Total 0.3 0.2 - 1.0 mg/dL    GOT/AST 18 <=37 Units/L    GPT/ALT 25 <64 Units/L    Alkaline Phosphatase 52 45 - 117 Units/L    Albumin 3.4 3.4 - 5.0 g/dL    Protein, Total 7.6 6.4 - 8.2 g/dL    Globulin 4.2 (H) 2.0 - 4.0 g/dL    A/G Ratio 0.8 (L) 1.0 - 2.4   CBC with Automated Differential (performable only)    Specimen: Blood, Venous   Result Value Ref Range    WBC 5.3 4.2 - 11.0 K/mcL    RBC 4.08 4.00 - 5.20 mil/mcL    HGB 7.4 (L) 12.0 - 15.5 g/dL    HCT 26.2 (L) 36.0 - 46.5 %    MCV 64.2 (L) 78.0 - 100.0 fl    MCH 18.1 (L) 26.0 - 34.0 pg    MCHC 28.2 (L) 32.0 - 36.5 g/dL    RDW-CV 21.4 (H) 11.0 - 15.0 %    RDW-SD 47.8 39.0 - 50.0 fL     140 - 450 K/mcL    NRBC 0 <=0 /100 WBC    Neutrophil, Percent 47 %    Lymphocytes, Percent 39 %    Mono, Percent 12 %    Eosinophils, Percent 1 %    Basophils, Percent 1 %    Immature Granulocytes 0 %    Absolute Neutrophils 2.5 1.8 - 7.7 K/mcL    Absolute Lymphocytes 2.0 1.0 - 4.8 K/mcL    Absolute Monocytes 0.7 0.3 - 0.9 K/mcL    Absolute Eosinophils  0.1 0.0 - 0.5 K/mcL    Absolute Basophils 0.0 0.0 - 0.3 K/mcL    Absolute Immature Granulocytes 0.0 0.0 - 0.2 K/mcL   Beta HCG Quantitative Pregnancy    Specimen: Blood, Venous   Result Value Ref Range    HCG, Quantitative <3 <=4 mUnits/mL   Urine pregnancy POC   Result Value Ref Range    URINE PREGNANCY,QUAL Negative     Internal Procedural Controls Acceptable Yes     TEST LOT NUMBER #[id number]     TEST LOT EXPIRATION DATE 03/19/2026    TYPE/SCREEN    Specimen: Blood, Venous    Result Value Ref Range    ABO/RH(D) B Rh Positive     ANTIBODY SCREEN Negative     TYPE AND SCREEN EXPIRATION DATE 12/17/2024 23:59        Radiology Results     Imaging Results    None         ED Medication Orders (From admission, onward)      None                 Medical Decision Making  HPI: This is a 34-year-old female with past medical history significant for fibroids who presents with abnormal CBC.  Patient was advised by her doctor to come to the emergency department due to a low hemoglobin.  Patient was told her hemoglobin was 7.3.  Patient denies any symptoms at this time.  Patient states she is currently on her menstrual cycle.  Patient denies fever, chills, shortness of breath, dizziness, headache.    Plan: CMP CBC     Disposition: Patient is well appearing and in no acute distress. Physical exam is otherwise unremarkable. Patient and I discussed that since she is asymptomatic we do not need to transfuse at this time. Pt to follow up with PCP and in agreement with plan. Pt no further questions at this time.           Clinical Impression     ED Diagnosis   1. Anemia, unspecified type            Disposition        Discharge 12/14/2024  2:02 PM  Shayla Crow discharge to home/self care.           Clari Sterling PA-C  12/14/24 1547     Lobby

## 2025-02-04 NOTE — OB RN PATIENT PROFILE - FUNCTIONAL ASSESSMENT - BASIC MOBILITY 6.
Home  4-calculated by average/Not able to assess (calculate score using [facility] averaging method)

## 2025-09-12 ENCOUNTER — APPOINTMENT (OUTPATIENT)
Dept: ULTRASOUND IMAGING | Facility: CLINIC | Age: 30
End: 2025-09-12
Payer: COMMERCIAL

## 2025-09-12 PROCEDURE — 76830 TRANSVAGINAL US NON-OB: CPT

## 2025-09-12 PROCEDURE — 76856 US EXAM PELVIC COMPLETE: CPT

## (undated) DEVICE — COTTONBALL LG

## (undated) DEVICE — APPLICATOR COTTON TIP 6" STERLE

## (undated) DEVICE — BEAVER BLADE MINI (ORANGE)

## (undated) DEVICE — ELCTR LOOP TIP BEAVER .5"

## (undated) DEVICE — ELCTR GROUNDING PAD ADULT COVIDIEN

## (undated) DEVICE — SOL IRR POUR NS 0.9% 500ML

## (undated) DEVICE — DRSG CURITY GAUZE SPONGE 4 X 4" 12-PLY

## (undated) DEVICE — WARMING BLANKET FULL ADULT

## (undated) DEVICE — ELCTR ROCKER SWITCH PENCIL BLUE 10FT

## (undated) DEVICE — SYR LUER LOK 3CC

## (undated) DEVICE — SUT VICRYL 6-0 18" P-1 UNDYED

## (undated) DEVICE — POSITIONER FOAM EGG CRATE ULNAR 2PCS (PINK)

## (undated) DEVICE — GLV 7.5 PROTEXIS (WHITE)

## (undated) DEVICE — SOL BALANCE SALT 15ML

## (undated) DEVICE — DRSG EYE PAD OVAL STERILE

## (undated) DEVICE — PREP BETADINE KIT

## (undated) DEVICE — PACK OPTH STRAB

## (undated) DEVICE — POSITIONER PATIENT SAFETY STRAP 3X60"

## (undated) DEVICE — SUT ETHILON 5-0 18" P-3

## (undated) DEVICE — BLADE SURGICAL #15 CARBON

## (undated) DEVICE — SUT VICRYL 6-0 27" RB-1 UNDYED

## (undated) DEVICE — SUT PLAIN GUT FAST ABSORBING 5-0 PC-1

## (undated) DEVICE — PROTECTOR CORNEAL BLUE ADULT

## (undated) DEVICE — NDL HYPO NONSAFE 30G X 0.5" (BEIGE)